# Patient Record
Sex: FEMALE | Race: ASIAN | Employment: FULL TIME | URBAN - METROPOLITAN AREA
[De-identification: names, ages, dates, MRNs, and addresses within clinical notes are randomized per-mention and may not be internally consistent; named-entity substitution may affect disease eponyms.]

---

## 2017-02-28 ENCOUNTER — TELEPHONE (OUTPATIENT)
Dept: FAMILY MEDICINE CLINIC | Facility: CLINIC | Age: 37
End: 2017-02-28

## 2017-02-28 ENCOUNTER — OFFICE VISIT (OUTPATIENT)
Dept: FAMILY MEDICINE CLINIC | Facility: CLINIC | Age: 37
End: 2017-02-28

## 2017-02-28 VITALS
RESPIRATION RATE: 16 BRPM | DIASTOLIC BLOOD PRESSURE: 58 MMHG | TEMPERATURE: 99 F | SYSTOLIC BLOOD PRESSURE: 102 MMHG | WEIGHT: 123 LBS | HEART RATE: 84 BPM | OXYGEN SATURATION: 98 % | BODY MASS INDEX: 25 KG/M2

## 2017-02-28 DIAGNOSIS — R50.9 FEVER, UNSPECIFIED FEVER CAUSE: Primary | ICD-10-CM

## 2017-02-28 DIAGNOSIS — J11.1 INFLUENZA-LIKE ILLNESS: ICD-10-CM

## 2017-02-28 LAB
CONTROL LINE PRESENT WITH A CLEAR BACKGROUND (YES/NO): YES YES/NO
STREP GRP A CUL-SCR: NEGATIVE

## 2017-02-28 PROCEDURE — 99213 OFFICE O/P EST LOW 20 MIN: CPT | Performed by: NURSE PRACTITIONER

## 2017-02-28 PROCEDURE — 87880 STREP A ASSAY W/OPTIC: CPT | Performed by: NURSE PRACTITIONER

## 2017-02-28 NOTE — PROGRESS NOTES
CHIEF COMPLAINT:   Patient presents with:  URI      HPI:   Mary Jane Springer is a 42year old female who presents for upper respiratory symptoms for  3 days.  Patient reports abrupt onset dry cough, body aches, fevers (tmax at onset 100-101F), scratchy throat NOSE: Nostrils patent, no visible nasal discharge, nasal mucosa pink and non inflamed. THROAT: Oral mucosa pink, moist. Posterior pharynx is mildly erythematous with small PND. No exudates.    NECK: Supple, non-tender  LUNGS: clear to auscultation bilatera Symptoms of the flu may be mild or severe. They can include extreme tiredness (wanting to stay in bed all day), chills, fevers, muscle aches, soreness with eye movement, headache, and a dry, hacking cough.   Home care  Follow these guidelines when caring fo · Severe weakness or dizziness  · You get a fever or cough after getting better for a few days  Date Last Reviewed: 12/23/2014  © 7352-4926 The [address] [place] [address], [address] [place] [address]. All rights reserved.  This information is not i

## 2017-02-28 NOTE — TELEPHONE ENCOUNTER
Patient called and left a message that she's had fever and chills since Saturday. She would like a call back suggesting what she can do.

## 2017-02-28 NOTE — TELEPHONE ENCOUNTER
Spoke to patient has fever 100 taking OTC tyl and motrin. Had body aches now has cough and congestion. Offered appointment says she is working. Advised to go to [facility] when she can f/u with PCP prn.

## 2017-06-20 RX ORDER — LEVOTHYROXINE SODIUM 112 UG/1
TABLET ORAL
Qty: 90 TABLET | Refills: 0 | Status: SHIPPED | OUTPATIENT
Start: 2017-06-20 | End: 2017-12-27

## 2017-06-20 NOTE — TELEPHONE ENCOUNTER
No future appointments. LOV 12/16    LAST LAB 11/16    LAST RX   Levothyroxine Sodium 112 MCG Oral Tab 90 tablet 1 12/23/2016       PROTOCOL  Thyroid Supplements Protocol Passed6/2    Refilled x 3.

## 2017-10-03 DIAGNOSIS — E03.9 ACQUIRED HYPOTHYROIDISM: ICD-10-CM

## 2017-10-03 RX ORDER — LEVOTHYROXINE SODIUM 112 UG/1
TABLET ORAL
Qty: 30 TABLET | Refills: 1 | Status: SHIPPED | OUTPATIENT
Start: 2017-10-03 | End: 2017-12-16

## 2017-10-03 NOTE — TELEPHONE ENCOUNTER
No future appointments. LOV 12/16     LAST LAB 11/16    LAST RX  Last refill: 9/10/2017    LEVOTHYROXINE SODIUM 112 MCG Oral Tab 90 tablet 0 6/20/2017       PROTOCOL  Thyroid Supplements Protocol Passed. Refilled x 2.

## 2017-12-16 DIAGNOSIS — E03.9 ACQUIRED HYPOTHYROIDISM: ICD-10-CM

## 2017-12-18 RX ORDER — LEVOTHYROXINE SODIUM 112 UG/1
TABLET ORAL
Qty: 15 TABLET | Refills: 0 | Status: SHIPPED | OUTPATIENT
Start: 2017-12-18 | End: 2017-12-27

## 2017-12-18 NOTE — TELEPHONE ENCOUNTER
Pt called back to schedule Appt for Medication Refill. Explained we will approve 2 weeks of medication and she will need to get labs done PRIOR to appt. Labs thru [facility]. Told pt labs will be in her chart tomorrow.  (Do not go to Lab this afternoon.)

## 2017-12-18 NOTE — TELEPHONE ENCOUNTER
No future appointments. LOV 12/16     LAST LAB 11/16     LAST RX   LEVOTHYROXINE SODIUM 112 MCG Oral Tab 30 tablet 1 10/3/2017     Sig: TAKE 1 TABLET(112 MCG) BY MOUTH BEFORE BREAKFAST    Notes to Pharmacy: Needs appt and lab for next refill.           P

## 2017-12-27 ENCOUNTER — OFFICE VISIT (OUTPATIENT)
Dept: FAMILY MEDICINE CLINIC | Facility: CLINIC | Age: 37
End: 2017-12-27

## 2017-12-27 VITALS
DIASTOLIC BLOOD PRESSURE: 54 MMHG | RESPIRATION RATE: 14 BRPM | HEART RATE: 74 BPM | HEIGHT: 59 IN | WEIGHT: 129.25 LBS | BODY MASS INDEX: 26.06 KG/M2 | TEMPERATURE: 99 F | SYSTOLIC BLOOD PRESSURE: 96 MMHG

## 2017-12-27 DIAGNOSIS — E03.9 ACQUIRED HYPOTHYROIDISM: Primary | ICD-10-CM

## 2017-12-27 PROCEDURE — 99213 OFFICE O/P EST LOW 20 MIN: CPT | Performed by: FAMILY MEDICINE

## 2017-12-27 RX ORDER — LEVOTHYROXINE SODIUM 112 UG/1
112 TABLET ORAL
Qty: 90 TABLET | Refills: 0 | Status: SHIPPED | OUTPATIENT
Start: 2017-12-27 | End: 2018-02-22

## 2017-12-27 NOTE — PROGRESS NOTES
HPI:   Lenny Willard is a 42year old female that presents for follow up hypothyroidism. TSH level was 6.6 on recent labs. Patient states she had been out of her levothyroxine for several days.   She is asymptomatic and denies any hair skin or nail change Oral Tab; Take 1 tablet (112 mcg total) by mouth before breakfast.  Dispense: 90 tablet; Refill: 0  - TSH+FREE T4; Future    Risks, benefits, and alternatives of current treatment plan discussed in detail. Red flags discussed to RTC or ED .  Questions and c

## 2018-02-22 ENCOUNTER — OFFICE VISIT (OUTPATIENT)
Dept: FAMILY MEDICINE CLINIC | Facility: CLINIC | Age: 38
End: 2018-02-22

## 2018-02-22 VITALS
HEART RATE: 59 BPM | OXYGEN SATURATION: 99 % | WEIGHT: 129.63 LBS | SYSTOLIC BLOOD PRESSURE: 110 MMHG | DIASTOLIC BLOOD PRESSURE: 62 MMHG | TEMPERATURE: 99 F | BODY MASS INDEX: 26.13 KG/M2 | RESPIRATION RATE: 15 BRPM | HEIGHT: 59.25 IN

## 2018-02-22 DIAGNOSIS — Z13.0 SCREENING FOR DEFICIENCY ANEMIA: ICD-10-CM

## 2018-02-22 DIAGNOSIS — Z13.29 SCREENING FOR THYROID DISORDER: ICD-10-CM

## 2018-02-22 DIAGNOSIS — Z01.419 WELL WOMAN EXAM WITH ROUTINE GYNECOLOGICAL EXAM: Primary | ICD-10-CM

## 2018-02-22 DIAGNOSIS — Z13.220 SCREENING, LIPID: ICD-10-CM

## 2018-02-22 DIAGNOSIS — Z13.1 SCREENING FOR DIABETES MELLITUS: ICD-10-CM

## 2018-02-22 DIAGNOSIS — E03.9 ACQUIRED HYPOTHYROIDISM: ICD-10-CM

## 2018-02-22 DIAGNOSIS — Z12.31 ENCOUNTER FOR SCREENING MAMMOGRAM FOR BREAST CANCER: ICD-10-CM

## 2018-02-22 PROCEDURE — 88175 CYTOPATH C/V AUTO FLUID REDO: CPT | Performed by: FAMILY MEDICINE

## 2018-02-22 PROCEDURE — 99395 PREV VISIT EST AGE 18-39: CPT | Performed by: FAMILY MEDICINE

## 2018-02-22 PROCEDURE — 87624 HPV HI-RISK TYP POOLED RSLT: CPT | Performed by: FAMILY MEDICINE

## 2018-02-22 RX ORDER — LEVOTHYROXINE SODIUM 112 UG/1
112 TABLET ORAL
Qty: 30 TABLET | Refills: 0 | Status: SHIPPED | OUTPATIENT
Start: 2018-02-22 | End: 2018-03-21

## 2018-02-22 NOTE — PROGRESS NOTES
Leonidas Mckenzie is a 42year old female here for Patient presents with: Well Adult: Physical with pap       HPI:   Patient complains of here for well exam.     Saw Dr. Parth Harvey but had run out of meds so she had TSH of 6.65, will reorder that now.      No heav No unusual bleeding or bruising, no lymph node enlargement or tenderness. Endocrine: No thyroid symptoms. No symptoms of diabetes. Respiratory: No cough, shortness of breath, dyspnea on exertion, wheezing.   Cardiovascular: No heart palpitations, irregula labia majora and minora, normal urethra. Vagina with normal introitus, no discharge. Cervix is normal,PAP was done. IUD string visible. Bimanual: No cervical motion tenderness. Uterus retroverted. Adnexae nontender, no masses.   MUSCULOSKELETAL: Back and ex

## 2018-02-22 NOTE — PATIENT INSTRUCTIONS
Health screening: Pap recommended ever 3 years. HPV testing over age 27, HIV screening available. Tdap or Td recommended if tetanus not received for 10 years.   Contraception discussed, consider gyne consult to discuss options for permanent sterilization at

## 2018-02-24 LAB — HPV I/H RISK 1 DNA SPEC QL NAA+PROBE: NEGATIVE

## 2018-02-27 LAB — LAST PAP RESULT: NORMAL

## 2018-03-21 ENCOUNTER — TELEPHONE (OUTPATIENT)
Dept: FAMILY MEDICINE CLINIC | Facility: CLINIC | Age: 38
End: 2018-03-21

## 2018-03-21 DIAGNOSIS — Z13.29 SCREENING FOR THYROID DISORDER: ICD-10-CM

## 2018-03-21 DIAGNOSIS — E03.9 ACQUIRED HYPOTHYROIDISM: ICD-10-CM

## 2018-03-21 LAB
ABSOLUTE BASOPHILS: 32 CELLS/UL (ref 0–200)
ABSOLUTE EOSINOPHILS: 131 CELLS/UL (ref 15–500)
ABSOLUTE LYMPHOCYTES: 2183 CELLS/UL (ref 850–3900)
ABSOLUTE MONOCYTES: 243 CELLS/UL (ref 200–950)
ABSOLUTE NEUTROPHILS: 1913 CELLS/UL (ref 1500–7800)
ALBUMIN/GLOBULIN RATIO: 1.7 (CALC) (ref 1–2.5)
ALBUMIN: 4.2 G/DL (ref 3.6–5.1)
ALKALINE PHOSPHATASE: 38 U/L (ref 33–115)
ALT: 13 U/L (ref 6–29)
AST: 17 U/L (ref 10–30)
BASOPHILS: 0.7 %
BILIRUBIN, TOTAL: 0.7 MG/DL (ref 0.2–1.2)
BUN: 9 MG/DL (ref 7–25)
CALCIUM: 9.2 MG/DL (ref 8.6–10.2)
CARBON DIOXIDE: 26 MMOL/L (ref 20–31)
CHLORIDE: 105 MMOL/L (ref 98–110)
CHOL/HDLC RATIO: 2.5 (CALC)
CHOLESTEROL, TOTAL: 159 MG/DL
CREATININE: 0.74 MG/DL (ref 0.5–1.1)
EGFR IF AFRICN AM: 117 ML/MIN/1.73M2
EGFR IF NONAFRICN AM: 101 ML/MIN/1.73M2
EOSINOPHILS: 2.9 %
GLOBULIN: 2.5 G/DL (CALC) (ref 1.9–3.7)
GLUCOSE: 91 MG/DL (ref 65–99)
HDL CHOLESTEROL: 63 MG/DL
HEMATOCRIT: 35.7 % (ref 35–45)
HEMOGLOBIN A1C: 4.9 % OF TOTAL HGB
HEMOGLOBIN: 11.7 G/DL (ref 11.7–15.5)
LDL-CHOLESTEROL: 82 MG/DL (CALC)
LYMPHOCYTES: 48.5 %
MCH: 29.8 PG (ref 27–33)
MCHC: 32.8 G/DL (ref 32–36)
MCV: 90.8 FL (ref 80–100)
MONOCYTES: 5.4 %
MPV: 10.6 FL (ref 7.5–12.5)
NEUTROPHILS: 42.5 %
NON-HDL CHOLESTEROL: 96 MG/DL (CALC)
PLATELET COUNT: 226 THOUSAND/UL (ref 140–400)
POTASSIUM: 4.5 MMOL/L (ref 3.5–5.3)
PROTEIN, TOTAL: 6.7 G/DL (ref 6.1–8.1)
RDW: 11.3 % (ref 11–15)
RED BLOOD CELL COUNT: 3.93 MILLION/UL (ref 3.8–5.1)
SODIUM: 137 MMOL/L (ref 135–146)
TRIGLYCERIDES: 65 MG/DL
TSH W/REFLEX TO FT4: 1.13 MIU/L
WHITE BLOOD CELL COUNT: 4.5 THOUSAND/UL (ref 3.8–10.8)

## 2018-03-22 RX ORDER — LEVOTHYROXINE SODIUM 112 UG/1
TABLET ORAL
Qty: 30 TABLET | Refills: 6 | Status: SHIPPED | OUTPATIENT
Start: 2018-03-22 | End: 2018-11-12

## 2018-03-22 NOTE — TELEPHONE ENCOUNTER
LOV 2/18    LAST LAB 3/18    LAST RX   Levothyroxine Sodium 112 MCG Oral Tab 30 tablet 0 2/22/2018       PROTOCOL  Thyroid Supplements Protocol Passed    Refilled x 6 months.

## 2018-11-12 ENCOUNTER — TELEPHONE (OUTPATIENT)
Dept: FAMILY MEDICINE CLINIC | Facility: CLINIC | Age: 38
End: 2018-11-12

## 2018-11-12 DIAGNOSIS — E03.9 ACQUIRED HYPOTHYROIDISM: ICD-10-CM

## 2018-11-12 DIAGNOSIS — Z13.29 SCREENING FOR THYROID DISORDER: ICD-10-CM

## 2018-11-12 RX ORDER — LEVOTHYROXINE SODIUM 112 UG/1
TABLET ORAL
Qty: 30 TABLET | Refills: 6 | Status: SHIPPED | OUTPATIENT
Start: 2018-11-12 | End: 2019-07-07

## 2018-11-12 NOTE — TELEPHONE ENCOUNTER
Pt is leaving for [facility] on 12/10/2018 states she was only given a refill for Levothyroxine for 30 days pt states she was told it would be for six months.  Please advise patient would like a call back today if possible in case she needs to do labs or come in

## 2018-11-13 NOTE — TELEPHONE ENCOUNTER
Patient notified of prescription renewal.  Deb Anderson would like to see her in March. Patient [place] country [place] and will call for appointment when she returns.

## 2019-04-19 ENCOUNTER — OFFICE VISIT (OUTPATIENT)
Dept: FAMILY MEDICINE CLINIC | Facility: CLINIC | Age: 39
End: 2019-04-19
Payer: COMMERCIAL

## 2019-04-19 VITALS
BODY MASS INDEX: 26 KG/M2 | WEIGHT: 130.69 LBS | RESPIRATION RATE: 15 BRPM | HEIGHT: 59.5 IN | OXYGEN SATURATION: 96 % | DIASTOLIC BLOOD PRESSURE: 64 MMHG | SYSTOLIC BLOOD PRESSURE: 102 MMHG | TEMPERATURE: 98 F | HEART RATE: 63 BPM

## 2019-04-19 DIAGNOSIS — Z13.1 SCREENING FOR DIABETES MELLITUS: ICD-10-CM

## 2019-04-19 DIAGNOSIS — E03.9 ACQUIRED HYPOTHYROIDISM: ICD-10-CM

## 2019-04-19 DIAGNOSIS — Z13.29 SCREENING FOR THYROID DISORDER: ICD-10-CM

## 2019-04-19 DIAGNOSIS — N63.22 BREAST LUMP ON LEFT SIDE AT 11 O'CLOCK POSITION: ICD-10-CM

## 2019-04-19 DIAGNOSIS — Z00.00 WELL ADULT EXAM: Primary | ICD-10-CM

## 2019-04-19 DIAGNOSIS — Z13.0 SCREENING FOR DEFICIENCY ANEMIA: ICD-10-CM

## 2019-04-19 DIAGNOSIS — Z78.9 VEGETARIAN DIET: ICD-10-CM

## 2019-04-19 DIAGNOSIS — Z30.431 CONTRACEPTIVE, SURVEILLANCE, INTRAUTERINE DEVICE: ICD-10-CM

## 2019-04-19 DIAGNOSIS — Z13.220 SCREENING, LIPID: ICD-10-CM

## 2019-04-19 PROCEDURE — 99396 PREV VISIT EST AGE 40-64: CPT | Performed by: FAMILY MEDICINE

## 2019-04-19 PROCEDURE — 99213 OFFICE O/P EST LOW 20 MIN: CPT | Performed by: FAMILY MEDICINE
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
## 2019-04-19 NOTE — PATIENT INSTRUCTIONS
Tdap (tetanus diphtheria pertussis) booster recommended if not received in previous 10 years.  Mammogram diagnostic ordered for lumpy changes left upper outer breast. If you still have your cervix (if your uterus was not removed), Pap recommended every 3 ye

## 2019-05-03 ENCOUNTER — TELEPHONE (OUTPATIENT)
Dept: FAMILY MEDICINE CLINIC | Facility: CLINIC | Age: 39
End: 2019-05-03

## 2019-05-03 ENCOUNTER — HOSPITAL ENCOUNTER (OUTPATIENT)
Dept: MAMMOGRAPHY | Facility: HOSPITAL | Age: 39
Discharge: HOME | End: 2019-05-03
Attending: FAMILY MEDICINE
Payer: COMMERCIAL

## 2019-05-03 DIAGNOSIS — N63.22 BREAST LUMP ON LEFT SIDE AT 11 O'CLOCK POSITION: ICD-10-CM

## 2019-05-03 PROCEDURE — 77066 DX MAMMO INCL CAD BI: CPT | Performed by: FAMILY MEDICINE

## 2019-05-03 PROCEDURE — 76641 ULTRASOUND BREAST COMPLETE: CPT | Performed by: FAMILY MEDICINE

## 2019-05-03 PROCEDURE — 77062 BREAST TOMOSYNTHESIS BI: CPT | Performed by: FAMILY MEDICINE

## 2019-05-03 NOTE — TELEPHONE ENCOUNTER
Called patient and explained per Dr. Shereen Felder, order cannot be changed because she has a breast lump. The mammogram is no longer screening. It needs to be diagnostic. Verbalizes understanding.

## 2019-05-03 NOTE — TELEPHONE ENCOUNTER
Dr. Nolberto Martinez,  Please advise    Patient called requesting mammogram order be change to screening. She checked with the hospital and with her insurance and a diagnostic mammogram will not be covered at 100%. Patient has appt for 1 pm today.

## 2019-05-03 NOTE — TELEPHONE ENCOUNTER
Patient called with question regarding mammogram and ultrasound, scheduled for today, transferred to triage, please call back

## 2019-05-03 NOTE — TELEPHONE ENCOUNTER
I can't change order because there is a lump. If we do a test because of a problem it is no longer screening, but investigation of a problem. Sorry.  I know coverage is different but radiology won't let us order screening for a problem that needs to be diag

## 2019-07-07 DIAGNOSIS — E03.9 ACQUIRED HYPOTHYROIDISM: ICD-10-CM

## 2019-07-07 DIAGNOSIS — Z13.29 SCREENING FOR THYROID DISORDER: ICD-10-CM

## 2019-07-08 RX ORDER — LEVOTHYROXINE SODIUM 112 UG/1
TABLET ORAL
Qty: 30 TABLET | Refills: 5 | Status: SHIPPED | OUTPATIENT
Start: 2019-07-08 | End: 2020-02-11

## 2019-07-08 NOTE — TELEPHONE ENCOUNTER
Name from pharmacy: LEVOTHYROXINE 0.112MG (112MCG) TABS         Will file in chart as: LEVOTHYROXINE SODIUM 112 MCG Oral Tab    Sig: TAKE 1 TABLET(112 MCG) BY MOUTH BEFORE BREAKFAST    Disp:  30 tablet    Refills:  0    Start: 7/7/2019    Class: Normal

## 2019-08-28 ENCOUNTER — TELEPHONE (OUTPATIENT)
Dept: FAMILY MEDICINE CLINIC | Facility: CLINIC | Age: 39
End: 2019-08-28

## 2019-08-28 DIAGNOSIS — E03.9 ACQUIRED HYPOTHYROIDISM: ICD-10-CM

## 2019-08-28 DIAGNOSIS — Z13.29 SCREENING FOR THYROID DISORDER: ICD-10-CM

## 2019-08-28 RX ORDER — LEVOTHYROXINE SODIUM 112 UG/1
TABLET ORAL
Qty: 30 TABLET | Refills: 0 | OUTPATIENT
Start: 2019-08-28

## 2019-08-28 NOTE — TELEPHONE ENCOUNTER
Requested Prescriptions     Pending Prescriptions Disp Refills   • Levothyroxine Sodium 112 MCG Oral Tab [Pharmacy Med Name: LEVOTHYROXINE 0.112MG (112MCG) TABS] 30 tablet 0     Sig: TAKE 1 TABLET BY MOUTH EVERY DAY         LOV: 4/19/19 w/ 1yr f/u recommen

## 2019-12-13 ENCOUNTER — OFFICE VISIT (OUTPATIENT)
Dept: FAMILY MEDICINE CLINIC | Facility: CLINIC | Age: 39
End: 2019-12-13
Payer: COMMERCIAL

## 2019-12-13 VITALS
DIASTOLIC BLOOD PRESSURE: 60 MMHG | HEIGHT: 60 IN | SYSTOLIC BLOOD PRESSURE: 90 MMHG | WEIGHT: 134 LBS | RESPIRATION RATE: 16 BRPM | BODY MASS INDEX: 26.31 KG/M2 | TEMPERATURE: 98 F | OXYGEN SATURATION: 100 % | HEART RATE: 73 BPM

## 2019-12-13 DIAGNOSIS — J04.0 LARYNGITIS: ICD-10-CM

## 2019-12-13 DIAGNOSIS — Z20.818 EXPOSURE TO STREP THROAT: ICD-10-CM

## 2019-12-13 DIAGNOSIS — J06.9 UPPER RESPIRATORY TRACT INFECTION, UNSPECIFIED TYPE: Primary | ICD-10-CM

## 2019-12-13 PROCEDURE — 99213 OFFICE O/P EST LOW 20 MIN: CPT | Performed by: FAMILY MEDICINE

## 2019-12-13 PROCEDURE — 87880 STREP A ASSAY W/OPTIC: CPT | Performed by: FAMILY MEDICINE

## 2019-12-13 RX ORDER — BENZONATATE 200 MG/1
200 CAPSULE ORAL 3 TIMES DAILY PRN
Qty: 20 CAPSULE | Refills: 1 | Status: SHIPPED | OUTPATIENT
Start: 2019-12-13 | End: 2020-07-17 | Stop reason: ALTCHOICE

## 2019-12-13 NOTE — PATIENT INSTRUCTIONS
Budesonide (Rhinocort), triamcinolone (Nasacort) or mometasone (Nasonex) nasal spray with a steroid, 1 spray each nostril daily for sinus congestion. Voice rest. Consider steroids if voice worse. Increased fluids, humidity.  Notify us if worsening laryng

## 2019-12-13 NOTE — PROGRESS NOTES
Romel Miguel IS A 37year old female HERE FOR Patient presents with:  Cold: cough. runny nose, congestion. pt was saying daughter was diagnose for positive strep. History of present illness:     2 nights ago bad cough, couldn't sleep.  Trying mucine control/protection: Paragard    Lifestyle      Physical activity:        Days per week: Not on file        Minutes per session: Not on file      Stress: Not on file    Relationships      Social connections:        Talks on phone: Not on file        Gets to strep throat  -     STREP A ASSAY W/OPTIC    Other orders  -     benzonatate 200 MG Oral Cap; Take 1 capsule (200 mg total) by mouth 3 (three) times daily as needed for cough.           Patient Instructions   Budesonide (Rhinocort), triamcinolone (Nasacort)

## 2020-02-09 ENCOUNTER — TELEPHONE (OUTPATIENT)
Dept: FAMILY MEDICINE CLINIC | Facility: CLINIC | Age: 40
End: 2020-02-09

## 2020-02-09 DIAGNOSIS — Z13.1 SCREENING FOR DIABETES MELLITUS: ICD-10-CM

## 2020-02-09 DIAGNOSIS — E03.9 ACQUIRED HYPOTHYROIDISM: ICD-10-CM

## 2020-02-09 DIAGNOSIS — Z13.0 SCREENING, ANEMIA, DEFICIENCY, IRON: Primary | ICD-10-CM

## 2020-02-09 DIAGNOSIS — Z13.29 SCREENING FOR THYROID DISORDER: ICD-10-CM

## 2020-02-09 DIAGNOSIS — Z13.220 SCREENING, LIPID: ICD-10-CM

## 2020-02-10 NOTE — TELEPHONE ENCOUNTER
Pt called asking status of the refill request.  Did not want to schedule brian. Wants to know why script denied. Pt is out of med.

## 2020-02-11 RX ORDER — LEVOTHYROXINE SODIUM 112 UG/1
TABLET ORAL
Qty: 30 TABLET | Refills: 5 | Status: SHIPPED | OUTPATIENT
Start: 2020-02-11 | End: 2020-08-17

## 2020-02-11 NOTE — TELEPHONE ENCOUNTER
I refilled, she should take med continuously for 2 months then get TSH done.  I entered screening lab codes also, will need ofr physical.

## 2020-02-11 NOTE — TELEPHONE ENCOUNTER
Last TSH 4/2019    Dr. Patti Manzanares - Should patient have labs done now and follow up or how would you like to proceed.        Pt is out of medication

## 2020-02-12 NOTE — TELEPHONE ENCOUNTER
Called patient and explained plan of care to take levothyroxine daily for two months and then have lab work done. Advised all routine fasting labs have been ordered for annual PE. Instructed to make appt for annual PE for mid April after blood draw.   She

## 2020-07-17 ENCOUNTER — OFFICE VISIT (OUTPATIENT)
Dept: FAMILY MEDICINE CLINIC | Facility: CLINIC | Age: 40
End: 2020-07-17
Payer: COMMERCIAL

## 2020-07-17 VITALS
OXYGEN SATURATION: 99 % | TEMPERATURE: 98 F | WEIGHT: 134 LBS | DIASTOLIC BLOOD PRESSURE: 40 MMHG | BODY MASS INDEX: 26.66 KG/M2 | HEIGHT: 59.5 IN | RESPIRATION RATE: 16 BRPM | SYSTOLIC BLOOD PRESSURE: 84 MMHG | HEART RATE: 64 BPM

## 2020-07-17 DIAGNOSIS — Z13.21 SCREENING FOR ENDOCRINE, NUTRITIONAL, METABOLIC AND IMMUNITY DISORDER: ICD-10-CM

## 2020-07-17 DIAGNOSIS — E03.9 ACQUIRED HYPOTHYROIDISM: Primary | ICD-10-CM

## 2020-07-17 DIAGNOSIS — Z13.29 SCREENING FOR THYROID DISORDER: ICD-10-CM

## 2020-07-17 DIAGNOSIS — Z13.29 SCREENING FOR ENDOCRINE, NUTRITIONAL, METABOLIC AND IMMUNITY DISORDER: ICD-10-CM

## 2020-07-17 DIAGNOSIS — Z12.31 ENCOUNTER FOR SCREENING MAMMOGRAM FOR BREAST CANCER: ICD-10-CM

## 2020-07-17 DIAGNOSIS — Z00.00 WELL ADULT EXAM: ICD-10-CM

## 2020-07-17 DIAGNOSIS — Z13.0 SCREENING FOR DEFICIENCY ANEMIA: ICD-10-CM

## 2020-07-17 DIAGNOSIS — Z13.1 SCREENING FOR DIABETES MELLITUS: ICD-10-CM

## 2020-07-17 DIAGNOSIS — Z13.0 SCREENING FOR ENDOCRINE, NUTRITIONAL, METABOLIC AND IMMUNITY DISORDER: ICD-10-CM

## 2020-07-17 DIAGNOSIS — Z13.220 SCREENING, LIPID: ICD-10-CM

## 2020-07-17 DIAGNOSIS — Z13.228 SCREENING FOR ENDOCRINE, NUTRITIONAL, METABOLIC AND IMMUNITY DISORDER: ICD-10-CM

## 2020-07-17 PROCEDURE — 3008F BODY MASS INDEX DOCD: CPT | Performed by: FAMILY MEDICINE

## 2020-07-17 PROCEDURE — 3074F SYST BP LT 130 MM HG: CPT | Performed by: FAMILY MEDICINE

## 2020-07-17 PROCEDURE — 3078F DIAST BP <80 MM HG: CPT | Performed by: FAMILY MEDICINE

## 2020-07-17 PROCEDURE — 99396 PREV VISIT EST AGE 40-64: CPT | Performed by: FAMILY MEDICINE

## 2020-07-17 NOTE — PROGRESS NOTES
Danis Rogers is a 41year old female here for Patient presents with: Well Adult      HPI:   Patient complains of here for well exam.     Feels well. Needs TSH for thyroid, has some refills. Had IUD changed. Saw Dr. Mary Sierra. Last fall.      Esperanza Rodríguez file        Gets together: Not on file        Attends [employer] service: Not on file        Active member of club or organization: Not on file        Attends meetings of clubs or organizations: Not on file        Relationship status: Not on file      Intim thyromegaly. CHEST: no chest wall tenderness. BREASTS: Texture normal, no dominant or suspicious mass, no nipple inversion or discharge, no axillary adenopathy. LUNGS: clear to auscultation. CARDIO: Regular rate and rhythm without murmur S3 or S4.   GI

## 2020-07-17 NOTE — PATIENT INSTRUCTIONS
Call for appt  lab tests, recommend scheduling with [facility].     Health screening: Mammogram recommended every 1-2 years after age 36. If you still have your cervix (if your uterus was not removed), Pap recommended every 3 years until age 72. Lifestyle:  Act provider to make sure you’re up-to-date on what you need.   Screening Who needs it How often   Type 2 diabetes or prediabetes All women beginning at age 39 and women without symptoms at any age who are overweight or obese and have 1 or more additional risk Talk with your healthcare provider about your health history.    Chlamydia Women at increased risk for infection At routine exams if you're at risk or have symptoms   Depression All women in this age group At routine exams   Gonorrhea Sexually active women women in this age group who have no record of these infections or vaccines 1 or 2 doses   Meningococcal Women at increased risk for infection–talk with your healthcare provider 1 or more doses   Pneumococcal conjugate vaccine (PCV13) and pneumococcal polys

## 2020-07-29 DIAGNOSIS — D50.9 IRON DEFICIENCY ANEMIA, UNSPECIFIED IRON DEFICIENCY ANEMIA TYPE: Primary | ICD-10-CM

## 2020-07-29 LAB
ABSOLUTE BASOPHILS: 49 CELLS/UL (ref 0–200)
ABSOLUTE EOSINOPHILS: 173 CELLS/UL (ref 15–500)
ABSOLUTE LYMPHOCYTES: 2630 CELLS/UL (ref 850–3900)
ABSOLUTE MONOCYTES: 394 CELLS/UL (ref 200–950)
ABSOLUTE NEUTROPHILS: 2155 CELLS/UL (ref 1500–7800)
ALBUMIN/GLOBULIN RATIO: 1.5 (CALC) (ref 1–2.5)
ALBUMIN: 3.9 G/DL (ref 3.6–5.1)
ALKALINE PHOSPHATASE: 48 U/L (ref 31–125)
ALT: 9 U/L (ref 6–29)
AST: 15 U/L (ref 10–30)
BASOPHILS: 0.9 %
BILIRUBIN, TOTAL: 0.5 MG/DL (ref 0.2–1.2)
BUN: 13 MG/DL (ref 7–25)
CALCIUM: 9.1 MG/DL (ref 8.6–10.2)
CARBON DIOXIDE: 27 MMOL/L (ref 20–32)
CHLORIDE: 109 MMOL/L (ref 98–110)
CHOL/HDLC RATIO: 2.9 (CALC)
CHOLESTEROL, TOTAL: 156 MG/DL
CREATININE: 0.81 MG/DL (ref 0.5–1.1)
EGFR IF AFRICN AM: 104 ML/MIN/1.73M2
EGFR IF NONAFRICN AM: 90 ML/MIN/1.73M2
EOSINOPHILS: 3.2 %
GLOBULIN: 2.6 G/DL (CALC) (ref 1.9–3.7)
GLUCOSE: 91 MG/DL (ref 65–99)
HDL CHOLESTEROL: 53 MG/DL
HEMATOCRIT: 34.9 % (ref 35–45)
HEMOGLOBIN A1C: 5.1 % OF TOTAL HGB
HEMOGLOBIN: 11.2 G/DL (ref 11.7–15.5)
LDL-CHOLESTEROL: 88 MG/DL (CALC)
LYMPHOCYTES: 48.7 %
MCH: 28.7 PG (ref 27–33)
MCHC: 32.1 G/DL (ref 32–36)
MCV: 89.5 FL (ref 80–100)
MONOCYTES: 7.3 %
MPV: 10.7 FL (ref 7.5–12.5)
NEUTROPHILS: 39.9 %
NON-HDL CHOLESTEROL: 103 MG/DL (CALC)
PLATELET COUNT: 243 THOUSAND/UL (ref 140–400)
POTASSIUM: 4.4 MMOL/L (ref 3.5–5.3)
PROTEIN, TOTAL: 6.5 G/DL (ref 6.1–8.1)
RDW: 11.6 % (ref 11–15)
RED BLOOD CELL COUNT: 3.9 MILLION/UL (ref 3.8–5.1)
SODIUM: 140 MMOL/L (ref 135–146)
TRIGLYCERIDES: 66 MG/DL
TSH W/REFLEX TO FT4: 1.44 MIU/L
WHITE BLOOD CELL COUNT: 5.4 THOUSAND/UL (ref 3.8–10.8)

## 2020-08-17 ENCOUNTER — TELEPHONE (OUTPATIENT)
Dept: FAMILY MEDICINE CLINIC | Facility: CLINIC | Age: 40
End: 2020-08-17

## 2020-08-17 DIAGNOSIS — Z13.29 SCREENING FOR THYROID DISORDER: ICD-10-CM

## 2020-08-17 DIAGNOSIS — E03.9 ACQUIRED HYPOTHYROIDISM: ICD-10-CM

## 2020-08-17 RX ORDER — LEVOTHYROXINE SODIUM 112 UG/1
TABLET ORAL
Qty: 30 TABLET | Refills: 5 | Status: SHIPPED | OUTPATIENT
Start: 2020-08-17 | End: 2021-02-16

## 2020-08-17 NOTE — TELEPHONE ENCOUNTER
Levothyroxine Sodium 112 MCG Oral Tab Sig:   TAKE 1 TABLET(112MCG) BY MOUTH BEFORE BREAKFAST    Patient is needing a refill on this medication.        [facility]  In [place] on [address]

## 2020-08-17 NOTE — TELEPHONE ENCOUNTER
LOV 7/17/2020    LAST LAB 7/28/20    LAST RX    Levothyroxine Sodium 112 MCG Oral Tab 30 tablet 5 2/11/2020    Sig:   TAKE 1 TABLET(112MCG) BY MOUTH BEFORE BREAKFAST            Next OV No future appointments.      PROTOCOL   Thyroid Supplements Protocol Pas

## 2021-02-16 DIAGNOSIS — Z13.29 SCREENING FOR THYROID DISORDER: ICD-10-CM

## 2021-02-16 DIAGNOSIS — E03.9 ACQUIRED HYPOTHYROIDISM: ICD-10-CM

## 2021-02-16 RX ORDER — LEVOTHYROXINE SODIUM 112 UG/1
TABLET ORAL
Qty: 30 TABLET | Refills: 2 | Status: SHIPPED | OUTPATIENT
Start: 2021-02-16 | End: 2021-05-20

## 2021-02-16 NOTE — TELEPHONE ENCOUNTER
LOV 7/17/2020    LAST LAB 7/28/2020     LAST RX   Levothyroxine Sodium 112 MCG Oral Tab 30 tablet 5 8/17/2020    Sig:   TAKE 1 TABLET(112 MCG) BY MOUTH BEFORE BREAKFAST           Next OV No future appointments.     PROTOCOL   Thyroid Supplements Protocol Pa

## 2021-03-05 ENCOUNTER — PATIENT MESSAGE (OUTPATIENT)
Dept: FAMILY MEDICINE CLINIC | Facility: CLINIC | Age: 41
End: 2021-03-05

## 2021-03-05 DIAGNOSIS — E03.9 ACQUIRED HYPOTHYROIDISM: Primary | ICD-10-CM

## 2021-03-05 DIAGNOSIS — R53.83 FATIGUE, UNSPECIFIED TYPE: ICD-10-CM

## 2021-03-05 NOTE — TELEPHONE ENCOUNTER
LOV: 7/17/20    Will pt need an appointment?   Thyroid labs pended for your approval if ok    Please advise, thank you

## 2021-03-05 NOTE — TELEPHONE ENCOUNTER
I can order a tsh, free T4 T3 but if normal will need appointment to discuss possible cause of symptoms. Notify her I did labs, if abnormal would like to do video visit to discuss.

## 2021-03-05 NOTE — TELEPHONE ENCOUNTER
PT calling about message below. Would like to know if she can get her thyroid levels checked. Will doctor place orders? Please advise.

## 2021-03-11 LAB
T3, FREE: 3 PG/ML (ref 2.3–4.2)
T4, FREE: 1.5 NG/DL (ref 0.8–1.8)
TSH: 1.25 MIU/L

## 2021-05-20 DIAGNOSIS — Z13.29 SCREENING FOR THYROID DISORDER: ICD-10-CM

## 2021-05-20 DIAGNOSIS — E03.9 ACQUIRED HYPOTHYROIDISM: ICD-10-CM

## 2021-05-20 RX ORDER — LEVOTHYROXINE SODIUM 112 UG/1
TABLET ORAL
Qty: 30 TABLET | Refills: 2 | Status: SHIPPED | OUTPATIENT
Start: 2021-05-20 | End: 2021-08-26

## 2021-05-20 NOTE — TELEPHONE ENCOUNTER
LOV 7/17/2020    LAST LAB 3/10/2021     LAST RX   LEVOTHYROXINE SODIUM 112 MCG Oral Tab 30 tablet 2 2/16/2021         Next OV No future appointments.       PROTOCOL passed

## 2021-08-23 ENCOUNTER — TELEPHONE (OUTPATIENT)
Dept: FAMILY MEDICINE CLINIC | Facility: CLINIC | Age: 41
End: 2021-08-23

## 2021-08-23 DIAGNOSIS — E03.9 ACQUIRED HYPOTHYROIDISM: ICD-10-CM

## 2021-08-23 DIAGNOSIS — Z13.29 SCREENING FOR THYROID DISORDER: ICD-10-CM

## 2021-08-23 RX ORDER — LEVOTHYROXINE SODIUM 112 UG/1
TABLET ORAL
Qty: 30 TABLET | Refills: 2 | OUTPATIENT
Start: 2021-08-23

## 2021-08-23 NOTE — TELEPHONE ENCOUNTER
[facility] for patient advising she needs appt for more refills as she hasn't been seen in over a year. Advised Dr. Gurpreet Oliver is retiring but will have another provided see her for refill until she chooses a new PCP.

## 2021-08-23 NOTE — TELEPHONE ENCOUNTER
Patient stated she received a notification that the refill request for Levothyroxine was denied. Patient said she has 4 days of medication. She asked why it was denied.

## 2021-08-26 RX ORDER — LEVOTHYROXINE SODIUM 112 UG/1
112 TABLET ORAL
Qty: 30 TABLET | Refills: 0 | Status: SHIPPED | OUTPATIENT
Start: 2021-08-26 | End: 2021-09-30

## 2021-08-26 NOTE — TELEPHONE ENCOUNTER
Future Appointments   Date Time Provider Billy Geronimo   9/3/2021  2:20 PM Elizabeth Record DO [facility]     Partial rx for levothyroxine sent to cover patient until seen at [address] next week.

## 2021-09-03 ENCOUNTER — OFFICE VISIT (OUTPATIENT)
Dept: FAMILY MEDICINE CLINIC | Facility: CLINIC | Age: 41
End: 2021-09-03
Payer: COMMERCIAL

## 2021-09-03 VITALS
WEIGHT: 144 LBS | BODY MASS INDEX: 28.65 KG/M2 | HEIGHT: 59.41 IN | HEART RATE: 54 BPM | OXYGEN SATURATION: 99 % | RESPIRATION RATE: 16 BRPM | DIASTOLIC BLOOD PRESSURE: 60 MMHG | SYSTOLIC BLOOD PRESSURE: 94 MMHG | TEMPERATURE: 98 F

## 2021-09-03 DIAGNOSIS — E55.9 VITAMIN D DEFICIENCY: ICD-10-CM

## 2021-09-03 DIAGNOSIS — E03.9 ACQUIRED HYPOTHYROIDISM: Primary | ICD-10-CM

## 2021-09-03 DIAGNOSIS — Z00.00 LABORATORY EXAM ORDERED AS PART OF ROUTINE GENERAL MEDICAL EXAMINATION: ICD-10-CM

## 2021-09-03 DIAGNOSIS — Z78.9 VEGETARIAN: ICD-10-CM

## 2021-09-03 DIAGNOSIS — D64.9 ANEMIA, UNSPECIFIED TYPE: ICD-10-CM

## 2021-09-03 PROCEDURE — 3078F DIAST BP <80 MM HG: CPT | Performed by: FAMILY MEDICINE

## 2021-09-03 PROCEDURE — 3074F SYST BP LT 130 MM HG: CPT | Performed by: FAMILY MEDICINE

## 2021-09-03 PROCEDURE — 3008F BODY MASS INDEX DOCD: CPT | Performed by: FAMILY MEDICINE

## 2021-09-03 PROCEDURE — 99213 OFFICE O/P EST LOW 20 MIN: CPT | Performed by: FAMILY MEDICINE

## 2021-09-03 NOTE — PROGRESS NOTES
HPI/Subjective:   Patient ID: Salomón Walter is a 35year old female. HPI   38yr old female presents for f/u on hypothyroidism. Hypothyroidism, taking levothyroxine 112mcg daily.  Notes wt gain over the past month or so and hair loss over the past coupl FREE T4    She understands and agrees with tx plan  RTC after completing labs for annual physical, sooner if needed    Orders Placed This Encounter      CBC With Diff      CMP      Lipid      TSH W Reflex To Free T4      Vitamin D, 25-Hydroxy      IRON AND

## 2021-09-25 LAB
% SATURATION: 33 % (CALC) (ref 16–45)
ABSOLUTE BASOPHILS: 20 CELLS/UL (ref 0–200)
ABSOLUTE EOSINOPHILS: 127 CELLS/UL (ref 15–500)
ABSOLUTE LYMPHOCYTES: 1980 CELLS/UL (ref 850–3900)
ABSOLUTE MONOCYTES: 348 CELLS/UL (ref 200–950)
ABSOLUTE NEUTROPHILS: 2426 CELLS/UL (ref 1500–7800)
ALBUMIN/GLOBULIN RATIO: 1.7 (CALC) (ref 1–2.5)
ALBUMIN: 4.3 G/DL (ref 3.6–5.1)
ALKALINE PHOSPHATASE: 45 U/L (ref 31–125)
ALT: 12 U/L (ref 6–29)
AST: 19 U/L (ref 10–30)
BASOPHILS: 0.4 %
BILIRUBIN, TOTAL: 0.5 MG/DL (ref 0.2–1.2)
BUN: 10 MG/DL (ref 7–25)
CALCIUM: 9.4 MG/DL (ref 8.6–10.2)
CARBON DIOXIDE: 28 MMOL/L (ref 20–32)
CHLORIDE: 107 MMOL/L (ref 98–110)
CHOL/HDLC RATIO: 2.6 (CALC)
CHOLESTEROL, TOTAL: 153 MG/DL
CREATININE: 0.7 MG/DL (ref 0.5–1.1)
EGFR IF AFRICN AM: 123 ML/MIN/1.73M2
EGFR IF NONAFRICN AM: 106 ML/MIN/1.73M2
EOSINOPHILS: 2.6 %
GLOBULIN: 2.5 G/DL (CALC) (ref 1.9–3.7)
GLUCOSE: 89 MG/DL (ref 65–99)
HDL CHOLESTEROL: 58 MG/DL
HEMATOCRIT: 34.4 % (ref 35–45)
HEMOGLOBIN: 11.2 G/DL (ref 11.7–15.5)
IRON BINDING CAPACITY: 284 MCG/DL (CALC) (ref 250–450)
IRON, TOTAL: 94 MCG/DL (ref 40–190)
LDL-CHOLESTEROL: 79 MG/DL (CALC)
LYMPHOCYTES: 40.4 %
MCH: 29.7 PG (ref 27–33)
MCHC: 32.6 G/DL (ref 32–36)
MCV: 91.2 FL (ref 80–100)
MONOCYTES: 7.1 %
MPV: 11 FL (ref 7.5–12.5)
NEUTROPHILS: 49.5 %
NON-HDL CHOLESTEROL: 95 MG/DL (CALC)
PLATELET COUNT: 249 THOUSAND/UL (ref 140–400)
POTASSIUM: 4.2 MMOL/L (ref 3.5–5.3)
PROTEIN, TOTAL: 6.8 G/DL (ref 6.1–8.1)
RDW: 11.6 % (ref 11–15)
RED BLOOD CELL COUNT: 3.77 MILLION/UL (ref 3.8–5.1)
SODIUM: 140 MMOL/L (ref 135–146)
TRIGLYCERIDES: 78 MG/DL
TSH W/REFLEX TO FT4: 0.51 MIU/L
VITAMIN B12: 334 PG/ML (ref 200–1100)
VITAMIN D, 25-OH, TOTAL: 21 NG/ML (ref 30–100)
WHITE BLOOD CELL COUNT: 4.9 THOUSAND/UL (ref 3.8–10.8)

## 2021-09-30 ENCOUNTER — TELEPHONE (OUTPATIENT)
Dept: FAMILY MEDICINE CLINIC | Facility: CLINIC | Age: 41
End: 2021-09-30

## 2021-09-30 DIAGNOSIS — E03.9 ACQUIRED HYPOTHYROIDISM: ICD-10-CM

## 2021-09-30 DIAGNOSIS — Z13.29 SCREENING FOR THYROID DISORDER: ICD-10-CM

## 2021-09-30 NOTE — TELEPHONE ENCOUNTER
Last refill  levothyroxine 112 MCG Oral Tab 30 tablet 0 8/26/2021    Sig:   Take 1 tablet (112 mcg total) by mouth before breakfast.         Per PCP OV notes 9/3/21: Acquired hypothyroidism  - cont levothyroxine 112mcg po daily  - will check TSH and adjust

## 2021-09-30 NOTE — TELEPHONE ENCOUNTER
Pt called stating she was seen on 9-3-21 for med f/u. Physical is scheduled for 10-18-21. Blood work was done. She does not understand why she cannot get the \" levothyroxine 112 MCG Oral Tab\" refilled. Pt said she is out.   Pharmacy told her they ar

## 2021-10-01 RX ORDER — LEVOTHYROXINE SODIUM 112 UG/1
112 TABLET ORAL
Qty: 90 TABLET | Refills: 1 | Status: SHIPPED | OUTPATIENT
Start: 2021-10-01 | End: 2021-10-18

## 2021-10-01 NOTE — TELEPHONE ENCOUNTER
Patient notified rx sent. Advised to be sure to keep appt with Dr. Jimena Brito on 10/18/21 to discuss lab results.

## 2021-10-18 ENCOUNTER — OFFICE VISIT (OUTPATIENT)
Dept: FAMILY MEDICINE CLINIC | Facility: CLINIC | Age: 41
End: 2021-10-18
Payer: COMMERCIAL

## 2021-10-18 VITALS
SYSTOLIC BLOOD PRESSURE: 114 MMHG | WEIGHT: 138.5 LBS | HEIGHT: 59.41 IN | HEART RATE: 71 BPM | DIASTOLIC BLOOD PRESSURE: 62 MMHG | RESPIRATION RATE: 16 BRPM | BODY MASS INDEX: 27.55 KG/M2 | TEMPERATURE: 97 F | OXYGEN SATURATION: 100 %

## 2021-10-18 DIAGNOSIS — E03.9 ACQUIRED HYPOTHYROIDISM: Primary | ICD-10-CM

## 2021-10-18 DIAGNOSIS — Z23 NEED FOR VACCINATION: ICD-10-CM

## 2021-10-18 DIAGNOSIS — E55.9 VITAMIN D DEFICIENCY: ICD-10-CM

## 2021-10-18 DIAGNOSIS — D64.9 ANEMIA, UNSPECIFIED TYPE: ICD-10-CM

## 2021-10-18 PROCEDURE — 3078F DIAST BP <80 MM HG: CPT | Performed by: FAMILY MEDICINE

## 2021-10-18 PROCEDURE — 99213 OFFICE O/P EST LOW 20 MIN: CPT | Performed by: FAMILY MEDICINE

## 2021-10-18 PROCEDURE — 90686 IIV4 VACC NO PRSV 0.5 ML IM: CPT | Performed by: FAMILY MEDICINE

## 2021-10-18 PROCEDURE — 90471 IMMUNIZATION ADMIN: CPT | Performed by: FAMILY MEDICINE

## 2021-10-18 PROCEDURE — 3008F BODY MASS INDEX DOCD: CPT | Performed by: FAMILY MEDICINE

## 2021-10-18 PROCEDURE — 3074F SYST BP LT 130 MM HG: CPT | Performed by: FAMILY MEDICINE

## 2021-10-18 RX ORDER — LEVOTHYROXINE SODIUM 112 UG/1
112 TABLET ORAL
Qty: 90 TABLET | Refills: 1 | Status: SHIPPED | OUTPATIENT
Start: 2021-10-18

## 2021-10-18 RX ORDER — ERGOCALCIFEROL 1.25 MG/1
50000 CAPSULE ORAL WEEKLY
Qty: 12 CAPSULE | Refills: 0 | Status: SHIPPED | OUTPATIENT
Start: 2021-10-18

## 2021-10-18 RX ORDER — MELATONIN
325
COMMUNITY

## 2021-10-18 NOTE — PROGRESS NOTES
Subjective:   Patient ID: Flako Marie is a 35year old female. HPI   38yr old female presents for f/u on her recent labs. Hypothyroidism, taking levothyroxine 112mcg po daily  Anemia, taking iron supplements otc.      History/Other:   Review of System This Encounter      FLULAVAL INFLUENZA VACCINE QUAD PRESERVATIVE FREE 0.5 ML      Meds This Visit:  Requested Prescriptions     Signed Prescriptions Disp Refills   • levothyroxine 112 MCG Oral Tab 90 tablet 1     Sig: Take 1 tablet (112 mcg total) by mouth

## 2021-10-18 NOTE — PATIENT INSTRUCTIONS
Anemia  Anemia is a condition that occurs when your body does not have enough healthy red blood cells (RBCs). RBCs are the parts of your blood that carry oxygen all over your body.  A protein called hemoglobin allows your RBCs to absorb and release oxygen shape of your blood cells. To do the test, a drop of your blood is looked at under a microscope. A stain is used to make the blood cells easier to see. · Iron studies. These tests measure the amount of iron in your blood.  Your body needs iron to make hemo intended as a substitute for professional medical care. Always follow your healthcare professional's instructions. Iron Supplements  Most people think of iron as a metal used to make pots, frying pans, and soup kettles.  This same metal (or mineral) added stool softener. · Eat a healthy diet to get all the nutrients your body needs. Caution  The amount of iron each person needs is different, and varies by age. Women who are pregnant or breastfeeding need extra iron.  But taking more than the suggeste

## 2021-12-08 DIAGNOSIS — E55.9 VITAMIN D DEFICIENCY: ICD-10-CM

## 2021-12-08 RX ORDER — ERGOCALCIFEROL 1.25 MG/1
50000 CAPSULE ORAL WEEKLY
Qty: 12 CAPSULE | Refills: 0 | OUTPATIENT
Start: 2021-12-08

## 2021-12-08 NOTE — TELEPHONE ENCOUNTER
ergocalciferol 1.25 MG (11506 UT) Oral Cap          Sig: Take 1 capsule (50,000 Units total) by mouth once a week.     Disp:  12 capsule    Refills:  0    Start: 12/8/2021    Class: Normal    Non-formulary      LOV 10/18/21     LAST LAB   9/24/21      LAST

## 2022-04-11 DIAGNOSIS — E03.9 ACQUIRED HYPOTHYROIDISM: ICD-10-CM

## 2022-04-11 RX ORDER — LEVOTHYROXINE SODIUM 112 UG/1
TABLET ORAL
Qty: 90 TABLET | Refills: 0 | Status: SHIPPED | OUTPATIENT
Start: 2022-04-11 | End: 2022-05-04

## 2022-04-11 NOTE — TELEPHONE ENCOUNTER
Thyroid Supplements Protocol Passed 04/11/2022 06:30 AM   Protocol Details  TSH test in past 12 months    TSH value between 0.350 and 5.500 IU/ml    Appointment in past 12 or next 3 months        LOV 10/18/21     LAST LAB  9/24/21     LAST RX 10/18/21 90 with 1     Next OV No future appointments.       PROTOCOL pass   Please schedule annual physical .

## 2022-05-04 ENCOUNTER — PATIENT MESSAGE (OUTPATIENT)
Dept: FAMILY MEDICINE CLINIC | Facility: CLINIC | Age: 42
End: 2022-05-04

## 2022-05-04 NOTE — TELEPHONE ENCOUNTER
From: Dannielle Bellamy  To: Viviane Morrow DO  Sent: 5/4/2022 6:35 AM CDT  Subject: Prescription refill     Hi, I need a prescription refill for my Levothyroxine. Can you please send it to the [facility] on [address]? Thanks.

## 2022-05-05 RX ORDER — LEVOTHYROXINE SODIUM 112 UG/1
112 TABLET ORAL
Qty: 90 TABLET | Refills: 0 | Status: SHIPPED | OUTPATIENT
Start: 2022-05-05

## 2022-05-05 NOTE — TELEPHONE ENCOUNTER
Thyroid Supplements Protocol Passed 05/05/2022 11:25 AM   Protocol Details  TSH test in past 12 months    TSH value between 0.350 and 5.500 IU/ml    Appointment in past 12 or next 3 months        4/11/22 90 tabs

## 2022-05-05 NOTE — TELEPHONE ENCOUNTER
Pt called to sp w/ nurse, said she wants to repeat labs in Sept 1yr after her last labs, said she will do physical and labs together in sept, pt asking if order can be sent for thyroid b/w so she can get refills until her next physical appt

## 2022-06-01 DIAGNOSIS — E55.9 VITAMIN D DEFICIENCY: ICD-10-CM

## 2022-06-02 ENCOUNTER — PATIENT MESSAGE (OUTPATIENT)
Dept: FAMILY MEDICINE CLINIC | Facility: CLINIC | Age: 42
End: 2022-06-02

## 2022-06-02 RX ORDER — ERGOCALCIFEROL 1.25 MG/1
CAPSULE ORAL
Qty: 12 CAPSULE | Refills: 0 | OUTPATIENT
Start: 2022-06-02

## 2022-07-07 ENCOUNTER — PATIENT MESSAGE (OUTPATIENT)
Dept: FAMILY MEDICINE CLINIC | Facility: CLINIC | Age: 42
End: 2022-07-07

## 2022-07-08 DIAGNOSIS — E03.9 ACQUIRED HYPOTHYROIDISM: ICD-10-CM

## 2022-07-11 RX ORDER — LEVOTHYROXINE SODIUM 112 UG/1
TABLET ORAL
Qty: 90 TABLET | Refills: 0 | Status: SHIPPED | OUTPATIENT
Start: 2022-07-11

## 2022-07-11 NOTE — TELEPHONE ENCOUNTER
Thyroid Supplements Protocol Passed 07/08/2022 09:27 AM   Protocol Details  TSH test in past 12 months    TSH value between 0.350 and 5.500 IU/ml    Appointment in past 12 or next 3 months     LOV 10/18/21     LAST LAB  9/24/21     LAST RX 5/5/22 90     Next OV   Future Appointments   Date Time Provider Billy Geronimo   8/29/2022 11:40 AM Edgar Garcia DO [facility]         PROTOCOL pass

## 2022-08-10 ENCOUNTER — PATIENT MESSAGE (OUTPATIENT)
Dept: FAMILY MEDICINE CLINIC | Facility: CLINIC | Age: 42
End: 2022-08-10

## 2022-08-10 NOTE — TELEPHONE ENCOUNTER
From: Dixie Swain  To: rAian High DO  Sent: 8/10/2022 9:02 AM CDT  Subject: Prescription refill    I had requested for a refill of my Levothyroxine until my appointment for annual physical on Aug 29. The prescription was refilled only for 30 days back in July. I need a refill until atleast i get bloodwork results back, after the appointment.

## 2022-08-29 ENCOUNTER — OFFICE VISIT (OUTPATIENT)
Dept: FAMILY MEDICINE CLINIC | Facility: CLINIC | Age: 42
End: 2022-08-29
Payer: COMMERCIAL

## 2022-08-29 VITALS
BODY MASS INDEX: 27.16 KG/M2 | OXYGEN SATURATION: 97 % | TEMPERATURE: 98 F | RESPIRATION RATE: 16 BRPM | SYSTOLIC BLOOD PRESSURE: 106 MMHG | WEIGHT: 136.5 LBS | HEIGHT: 59.61 IN | DIASTOLIC BLOOD PRESSURE: 62 MMHG | HEART RATE: 67 BPM

## 2022-08-29 DIAGNOSIS — E55.9 VITAMIN D DEFICIENCY: ICD-10-CM

## 2022-08-29 DIAGNOSIS — Z12.31 VISIT FOR SCREENING MAMMOGRAM: ICD-10-CM

## 2022-08-29 DIAGNOSIS — Z78.9 VEGETARIAN: ICD-10-CM

## 2022-08-29 DIAGNOSIS — Z00.00 LABORATORY EXAM ORDERED AS PART OF ROUTINE GENERAL MEDICAL EXAMINATION: ICD-10-CM

## 2022-08-29 DIAGNOSIS — D64.9 ANEMIA, UNSPECIFIED TYPE: ICD-10-CM

## 2022-08-29 DIAGNOSIS — Z23 NEED FOR VACCINATION: ICD-10-CM

## 2022-08-29 DIAGNOSIS — Z00.00 WELL WOMAN EXAM (NO GYNECOLOGICAL EXAM): Primary | ICD-10-CM

## 2022-08-29 DIAGNOSIS — E03.9 ACQUIRED HYPOTHYROIDISM: ICD-10-CM

## 2022-08-29 PROCEDURE — 3078F DIAST BP <80 MM HG: CPT | Performed by: FAMILY MEDICINE

## 2022-08-29 PROCEDURE — 99396 PREV VISIT EST AGE 40-64: CPT | Performed by: FAMILY MEDICINE

## 2022-08-29 PROCEDURE — 3008F BODY MASS INDEX DOCD: CPT | Performed by: FAMILY MEDICINE

## 2022-08-29 PROCEDURE — 99213 OFFICE O/P EST LOW 20 MIN: CPT | Performed by: FAMILY MEDICINE

## 2022-08-29 PROCEDURE — 3074F SYST BP LT 130 MM HG: CPT | Performed by: FAMILY MEDICINE

## 2022-08-29 PROCEDURE — 90471 IMMUNIZATION ADMIN: CPT | Performed by: FAMILY MEDICINE

## 2022-08-29 PROCEDURE — 90715 TDAP VACCINE 7 YRS/> IM: CPT | Performed by: FAMILY MEDICINE

## 2022-08-31 LAB
ABSOLUTE BASOPHILS: 38 CELLS/UL (ref 0–200)
ABSOLUTE EOSINOPHILS: 140 CELLS/UL (ref 15–500)
ABSOLUTE LYMPHOCYTES: 2176 CELLS/UL (ref 850–3900)
ABSOLUTE MONOCYTES: 340 CELLS/UL (ref 200–950)
ABSOLUTE NEUTROPHILS: 2705 CELLS/UL (ref 1500–7800)
ALBUMIN/GLOBULIN RATIO: 1.7 (CALC) (ref 1–2.5)
ALBUMIN: 4.2 G/DL (ref 3.6–5.1)
ALKALINE PHOSPHATASE: 42 U/L (ref 31–125)
ALT: 9 U/L (ref 6–29)
AST: 14 U/L (ref 10–30)
BASOPHILS: 0.7 %
BILIRUBIN, TOTAL: 0.5 MG/DL (ref 0.2–1.2)
BUN: 11 MG/DL (ref 7–25)
CALCIUM: 9.2 MG/DL (ref 8.6–10.2)
CARBON DIOXIDE: 28 MMOL/L (ref 20–32)
CHLORIDE: 109 MMOL/L (ref 98–110)
CHOL/HDLC RATIO: 2.7 (CALC)
CHOLESTEROL, TOTAL: 149 MG/DL
CREATININE: 0.73 MG/DL (ref 0.5–0.99)
EGFR: 104 ML/MIN/1.73M2
EOSINOPHILS: 2.6 %
GLOBULIN: 2.5 G/DL (CALC) (ref 1.9–3.7)
GLUCOSE: 97 MG/DL (ref 65–99)
HDL CHOLESTEROL: 55 MG/DL
HEMATOCRIT: 34.5 % (ref 35–45)
HEMOGLOBIN: 11.5 G/DL (ref 11.7–15.5)
LDL-CHOLESTEROL: 79 MG/DL (CALC)
LYMPHOCYTES: 40.3 %
MCH: 30.8 PG (ref 27–33)
MCHC: 33.3 G/DL (ref 32–36)
MCV: 92.5 FL (ref 80–100)
MONOCYTES: 6.3 %
MPV: 11 FL (ref 7.5–12.5)
NEUTROPHILS: 50.1 %
NON-HDL CHOLESTEROL: 94 MG/DL (CALC)
PLATELET COUNT: 245 THOUSAND/UL (ref 140–400)
POTASSIUM: 4.7 MMOL/L (ref 3.5–5.3)
PROTEIN, TOTAL: 6.7 G/DL (ref 6.1–8.1)
RDW: 11.5 % (ref 11–15)
RED BLOOD CELL COUNT: 3.73 MILLION/UL (ref 3.8–5.1)
SODIUM: 141 MMOL/L (ref 135–146)
TRIGLYCERIDES: 71 MG/DL
TSH W/REFLEX TO FT4: 0.43 MIU/L
VITAMIN B12: 440 PG/ML (ref 200–1100)
VITAMIN D, 25-OH, TOTAL: 23 NG/ML (ref 30–100)
WHITE BLOOD CELL COUNT: 5.4 THOUSAND/UL (ref 3.8–10.8)

## 2022-09-02 ENCOUNTER — HOSPITAL ENCOUNTER (OUTPATIENT)
Dept: MAMMOGRAPHY | Age: 42
Discharge: HOME | End: 2022-09-02
Attending: FAMILY MEDICINE
Payer: COMMERCIAL

## 2022-09-02 DIAGNOSIS — Z12.31 VISIT FOR SCREENING MAMMOGRAM: ICD-10-CM

## 2022-09-07 ENCOUNTER — TELEPHONE (OUTPATIENT)
Dept: FAMILY MEDICINE CLINIC | Facility: CLINIC | Age: 42
End: 2022-09-07

## 2022-09-07 DIAGNOSIS — E03.9 ACQUIRED HYPOTHYROIDISM: ICD-10-CM

## 2022-09-07 RX ORDER — LEVOTHYROXINE SODIUM 112 UG/1
112 TABLET ORAL
Qty: 90 TABLET | Refills: 1 | Status: SHIPPED | OUTPATIENT
Start: 2022-09-07

## 2022-09-07 NOTE — TELEPHONE ENCOUNTER
----- Message from [facility], DO sent at 9/6/2022 12:41 PM CDT -----  Pls inform pt that labs are stable. Anemia similar to previous. Vit d is low, she should be taking vit d3 2000iu daily otc. TSH wnl, cont current dose of levothyroxine.

## 2022-09-08 ENCOUNTER — HOSPITAL ENCOUNTER (OUTPATIENT)
Dept: MAMMOGRAPHY | Age: 42
Discharge: HOME | End: 2022-09-08
Attending: FAMILY MEDICINE
Payer: COMMERCIAL

## 2022-09-08 PROCEDURE — 77063 BREAST TOMOSYNTHESIS BI: CPT | Performed by: FAMILY MEDICINE

## 2022-09-08 PROCEDURE — 77067 SCR MAMMO BI INCL CAD: CPT | Performed by: FAMILY MEDICINE

## 2022-09-09 ENCOUNTER — TELEPHONE (OUTPATIENT)
Dept: FAMILY MEDICINE CLINIC | Facility: CLINIC | Age: 42
End: 2022-09-09

## 2022-09-09 NOTE — TELEPHONE ENCOUNTER
Pt was told she will need additional testing after having her mammogram, pt would like to sp w/  about results before going for additional test

## 2022-09-09 NOTE — TELEPHONE ENCOUNTER
Patient was contacted and we discussed the different between additional views vs the ultrasound that was ordered in 2019. Patient is concerned because last time she did additional views (ultrasound) she had bill of $3000. She was advised to speak with the mammogram department on how additional views are charged as clinical is unsure being additional views are diagnostic and unsure of patients insurance benefits coverage. She does have hx of left breast cyst but advised may be necessary to make sure no changes to appearance of cyst. She would like to get input of Dr. Pawan Waller prior to completing (she will schedule for end of next week to have time for response) as she wants to make sure she is not repeating testing for nothing.

## 2022-09-14 NOTE — TELEPHONE ENCOUNTER
Would agree with radiology if requesting additional views/US to make sure there are no new findings.  May call dept to see cost associated with additional testing

## 2022-10-14 ENCOUNTER — HOSPITAL ENCOUNTER (OUTPATIENT)
Dept: ULTRASOUND IMAGING | Age: 42
Discharge: HOME | End: 2022-10-14
Attending: FAMILY MEDICINE
Payer: COMMERCIAL

## 2022-10-14 ENCOUNTER — HOSPITAL ENCOUNTER (OUTPATIENT)
Dept: MAMMOGRAPHY | Age: 42
Discharge: HOME | End: 2022-10-14
Attending: FAMILY MEDICINE
Payer: COMMERCIAL

## 2022-10-14 DIAGNOSIS — R92.2 INCONCLUSIVE MAMMOGRAM: ICD-10-CM

## 2022-10-14 PROCEDURE — 76642 ULTRASOUND BREAST LIMITED: CPT | Performed by: FAMILY MEDICINE

## 2022-10-14 PROCEDURE — 77065 DX MAMMO INCL CAD UNI: CPT | Performed by: FAMILY MEDICINE

## 2022-10-14 PROCEDURE — 77061 BREAST TOMOSYNTHESIS UNI: CPT | Performed by: FAMILY MEDICINE

## 2023-01-11 ENCOUNTER — TELEPHONE (OUTPATIENT)
Dept: FAMILY MEDICINE CLINIC | Facility: CLINIC | Age: 43
End: 2023-01-11

## 2023-01-11 NOTE — TELEPHONE ENCOUNTER
Pt. Calling to report billing issue. Pt. Stated she received bill from [employer] B-12 was not covered by ins as it was ordered as part of annual pe. Advised pt.-will check coding with provider and f/u with update.

## 2023-01-13 DIAGNOSIS — D64.9 ANEMIA, UNSPECIFIED TYPE: ICD-10-CM

## 2023-01-13 DIAGNOSIS — E55.9 VITAMIN D DEFICIENCY: ICD-10-CM

## 2023-01-13 RX ORDER — MELATONIN
325
Refills: 0 | Status: CANCELLED | OUTPATIENT
Start: 2023-01-13

## 2023-01-13 RX ORDER — ERGOCALCIFEROL 1.25 MG/1
50000 CAPSULE ORAL WEEKLY
Qty: 12 CAPSULE | Refills: 0 | Status: CANCELLED | OUTPATIENT
Start: 2023-01-13

## 2023-02-08 NOTE — TELEPHONE ENCOUNTER
Pt called for an update regarding the charge for B12 lab. Previous B12 labs were covered. Would like a call back regarding this matter. Pt received a final notice regarding this bill.

## 2023-03-15 DIAGNOSIS — E03.9 ACQUIRED HYPOTHYROIDISM: ICD-10-CM

## 2023-03-16 RX ORDER — LEVOTHYROXINE SODIUM 112 UG/1
TABLET ORAL
Qty: 90 TABLET | Refills: 1 | Status: SHIPPED | OUTPATIENT
Start: 2023-03-16

## 2023-03-16 NOTE — TELEPHONE ENCOUNTER
Thyroid Supplements Protocol Passed 03/15/2023 01:39 PM   Protocol Details  TSH test in past 12 months    TSH value between 0.350 and 5.500 IU/ml    Appointment in past 12 or next 3 months     LOV 8/29/22     LAST LAB  8/30/22     LAST RX  9/7/22 90 with 1      Next OV No future appointments.       PROTOCOL pass

## 2023-04-21 NOTE — TELEPHONE ENCOUNTER
Pt called for an update. . I routed it to [facility] when she was still here. It looks like the message was not routed to anyone else. Please read previous TE's. Please call pt back by Wednesday with an answer.

## 2023-04-27 NOTE — TELEPHONE ENCOUNTER
Left message to call back. Please let patient know to give D64.9 code to [facility] for B12 lab. That is the only difference is from the last lab done.

## 2023-08-07 ENCOUNTER — OFFICE VISIT (OUTPATIENT)
Dept: FAMILY MEDICINE CLINIC | Facility: CLINIC | Age: 43
End: 2023-08-07
Payer: COMMERCIAL

## 2023-08-07 VITALS
WEIGHT: 138.5 LBS | TEMPERATURE: 98 F | RESPIRATION RATE: 16 BRPM | DIASTOLIC BLOOD PRESSURE: 60 MMHG | HEIGHT: 59.45 IN | OXYGEN SATURATION: 99 % | HEART RATE: 69 BPM | SYSTOLIC BLOOD PRESSURE: 104 MMHG | BODY MASS INDEX: 27.55 KG/M2

## 2023-08-07 DIAGNOSIS — E55.9 VITAMIN D DEFICIENCY: ICD-10-CM

## 2023-08-07 DIAGNOSIS — Z12.31 VISIT FOR SCREENING MAMMOGRAM: ICD-10-CM

## 2023-08-07 DIAGNOSIS — N92.6 IRREGULAR MENSES: ICD-10-CM

## 2023-08-07 DIAGNOSIS — Z78.9 VEGETARIAN: ICD-10-CM

## 2023-08-07 DIAGNOSIS — Z13.21 ENCOUNTER FOR VITAMIN DEFICIENCY SCREENING: ICD-10-CM

## 2023-08-07 DIAGNOSIS — E03.9 ACQUIRED HYPOTHYROIDISM: ICD-10-CM

## 2023-08-07 DIAGNOSIS — Z00.00 LABORATORY EXAM ORDERED AS PART OF ROUTINE GENERAL MEDICAL EXAMINATION: ICD-10-CM

## 2023-08-07 DIAGNOSIS — Z12.11 SCREENING FOR COLON CANCER: ICD-10-CM

## 2023-08-07 DIAGNOSIS — Z01.419 WELL WOMAN EXAM WITH ROUTINE GYNECOLOGICAL EXAM: Primary | ICD-10-CM

## 2023-08-07 PROCEDURE — 3008F BODY MASS INDEX DOCD: CPT | Performed by: FAMILY MEDICINE

## 2023-08-07 PROCEDURE — 3074F SYST BP LT 130 MM HG: CPT | Performed by: FAMILY MEDICINE

## 2023-08-07 PROCEDURE — 99213 OFFICE O/P EST LOW 20 MIN: CPT | Performed by: FAMILY MEDICINE

## 2023-08-07 PROCEDURE — 99396 PREV VISIT EST AGE 40-64: CPT | Performed by: FAMILY MEDICINE

## 2023-08-07 PROCEDURE — 3078F DIAST BP <80 MM HG: CPT | Performed by: FAMILY MEDICINE

## 2023-08-07 RX ORDER — MULTIVITAMIN WITH IRON
250 TABLET ORAL
COMMUNITY

## 2023-08-07 RX ORDER — ZINC GLUCONATE 50 MG
TABLET ORAL
COMMUNITY

## 2023-08-19 LAB
ABSOLUTE BASOPHILS: 28 CELLS/UL (ref 0–200)
ABSOLUTE EOSINOPHILS: 156 CELLS/UL (ref 15–500)
ABSOLUTE LYMPHOCYTES: 1766 CELLS/UL (ref 850–3900)
ABSOLUTE MONOCYTES: 299 CELLS/UL (ref 200–950)
ABSOLUTE NEUTROPHILS: 2351 CELLS/UL (ref 1500–7800)
ALBUMIN/GLOBULIN RATIO: 1.5 (CALC) (ref 1–2.5)
ALBUMIN: 4.2 G/DL (ref 3.6–5.1)
ALKALINE PHOSPHATASE: 49 U/L (ref 31–125)
ALT: 9 U/L (ref 6–29)
AST: 16 U/L (ref 10–35)
BASOPHILS: 0.6 %
BILIRUBIN, TOTAL: 0.5 MG/DL (ref 0.2–1.2)
BUN: 11 MG/DL (ref 7–25)
CALCIUM: 9.4 MG/DL (ref 8.6–10.2)
CARBON DIOXIDE: 27 MMOL/L (ref 20–32)
CHLORIDE: 107 MMOL/L (ref 98–110)
CHOL/HDLC RATIO: 3 (CALC)
CHOLESTEROL, TOTAL: 172 MG/DL
CREATININE: 0.7 MG/DL (ref 0.5–0.99)
EGFR: 109 ML/MIN/1.73M2
EOSINOPHILS: 3.4 %
FSH: 10.6 MIU/ML
GLOBULIN: 2.8 G/DL (CALC) (ref 1.9–3.7)
GLUCOSE: 92 MG/DL (ref 65–99)
HDL CHOLESTEROL: 58 MG/DL
HEMATOCRIT: 37.3 % (ref 35–45)
HEMOGLOBIN: 12.1 G/DL (ref 11.7–15.5)
LDL-CHOLESTEROL: 95 MG/DL (CALC)
LH: 3.7 MIU/ML
LYMPHOCYTES: 38.4 %
MCH: 30.1 PG (ref 27–33)
MCHC: 32.4 G/DL (ref 32–36)
MCV: 92.8 FL (ref 80–100)
MONOCYTES: 6.5 %
MPV: 10.2 FL (ref 7.5–12.5)
NEUTROPHILS: 51.1 %
NON-HDL CHOLESTEROL: 114 MG/DL (CALC)
PLATELET COUNT: 280 THOUSAND/UL (ref 140–400)
POTASSIUM: 4.9 MMOL/L (ref 3.5–5.3)
PROTEIN, TOTAL: 7 G/DL (ref 6.1–8.1)
RDW: 11.3 % (ref 11–15)
RED BLOOD CELL COUNT: 4.02 MILLION/UL (ref 3.8–5.1)
SODIUM: 142 MMOL/L (ref 135–146)
T3, FREE: 3 PG/ML (ref 2.3–4.2)
T4, FREE: 1.3 NG/DL (ref 0.8–1.8)
TRIGLYCERIDES: 96 MG/DL
TSH W/REFLEX TO FT4: 0.82 MIU/L
TSH: 0.82 MIU/L
VITAMIN B12: 783 PG/ML (ref 200–1100)
VITAMIN D, 25-OH, TOTAL: 32 NG/ML (ref 30–100)
WHITE BLOOD CELL COUNT: 4.6 THOUSAND/UL (ref 3.8–10.8)

## 2023-08-23 LAB
HPV I/H RISK 1 DNA SPEC QL NAA+PROBE: NEGATIVE
PAP HISTORY (OTHER THAN LAST PAP): NORMAL

## 2023-10-16 DIAGNOSIS — E03.9 ACQUIRED HYPOTHYROIDISM: ICD-10-CM

## 2023-10-16 RX ORDER — LEVOTHYROXINE SODIUM 112 UG/1
TABLET ORAL
Qty: 90 TABLET | Refills: 1 | Status: SHIPPED | OUTPATIENT
Start: 2023-10-16

## 2023-10-16 NOTE — TELEPHONE ENCOUNTER
Thyroid Supplements Protocol [id number]/16/2023 12:35 PM   Protocol Details TSH test in past 12 months    TSH value between 0.350 and 5.500 IU/ml    Appointment in past 12 or next 3 months        LOV 8/7/23     LAST LAB 8/19/23     LAST RX  3/16/23 90 with 1     Next OV No future appointments.       PROTOCOL pass

## 2024-01-31 DIAGNOSIS — E03.9 ACQUIRED HYPOTHYROIDISM: ICD-10-CM

## 2024-01-31 RX ORDER — LEVOTHYROXINE SODIUM 112 UG/1
112 TABLET ORAL
Qty: 90 TABLET | Refills: 1 | Status: SHIPPED | OUTPATIENT
Start: 2024-01-31

## 2024-01-31 NOTE — TELEPHONE ENCOUNTER
[facility] .      Thyroid Supplements Protocol [id number]/31/2024 08:34 AM   Protocol Details TSH test in past 12 months    TSH value between 0.350 and 5.500 IU/ml    Appointment in past 12 or next 3 months      LOV 8/7/23     LAST LAB  8/19/23     LAST RX 10/16/23 90 with 1     Next OV No future appointments.      PROTOCOL pass

## 2024-05-13 ENCOUNTER — OFFICE VISIT (OUTPATIENT)
Dept: FAMILY MEDICINE CLINIC | Facility: CLINIC | Age: 44
End: 2024-05-13
Payer: COMMERCIAL

## 2024-05-13 VITALS
DIASTOLIC BLOOD PRESSURE: 62 MMHG | RESPIRATION RATE: 16 BRPM | TEMPERATURE: 99 F | HEIGHT: 59.57 IN | BODY MASS INDEX: 29.49 KG/M2 | SYSTOLIC BLOOD PRESSURE: 104 MMHG | HEART RATE: 68 BPM | WEIGHT: 148.25 LBS | OXYGEN SATURATION: 99 %

## 2024-05-13 DIAGNOSIS — R11.2 NAUSEA AND VOMITING, UNSPECIFIED VOMITING TYPE: Primary | ICD-10-CM

## 2024-05-13 DIAGNOSIS — K21.00 GASTROESOPHAGEAL REFLUX DISEASE WITH ESOPHAGITIS WITHOUT HEMORRHAGE: ICD-10-CM

## 2024-05-13 PROCEDURE — 99213 OFFICE O/P EST LOW 20 MIN: CPT | Performed by: FAMILY MEDICINE

## 2024-05-13 RX ORDER — OMEPRAZOLE 40 MG/1
40 CAPSULE, DELAYED RELEASE ORAL
Qty: 30 CAPSULE | Refills: 0 | Status: SHIPPED | OUTPATIENT
Start: 2024-05-13

## 2024-05-13 NOTE — PROGRESS NOTES
Subjective:   Patient ID: Uma Sky is a 44 year old female.    HPI  44yr old female presents for c/o burning sensation and sore throat after an episode of emesis yesterday. States she was taking her magnesium and zinc supplements yesterday and just immediately started coughing and choking. She vomited everything. States she lost her voice. Had a burning sensation in her throat for about 3-4hrs. Took Prilosec and it seemed to get a little better. Still was unable to talk last night. Was unable to sleep due to discomfort. Took Tylenol early morning. States the burning sensation is almost gone and her throat just feels sore. Denies any GERD symptoms otherwise. No abdominal pain.      History/Other:   Review of Systems   HENT:  Positive for sore throat. Negative for trouble swallowing.    Respiratory:  Negative for cough.    Gastrointestinal:  Negative for abdominal pain, nausea and vomiting.     Current Outpatient Medications   Medication Sig Dispense Refill    Omeprazole 40 MG Oral Capsule Delayed Release Take 1 capsule (40 mg total) by mouth daily with breakfast. 30 capsule 0    levothyroxine 112 MCG Oral Tab Take 1 tablet (112 mcg total) by mouth before breakfast. 90 tablet 1    magnesium 250 MG Oral Tab Take 1 tablet (250 mg total) by mouth.      Zinc 50 MG Oral Tab Take by mouth.      Cholecalciferol 125 MCG (5000 UT) Oral Tab Take 1 tablet (5,000 Units total) by mouth daily.      PARAGARD INTRAUTERINE COPPER Intrauterine IUD by Intrauterine route.       Allergies:No Known Allergies    Objective:   Physical Exam  Vitals and nursing note reviewed.   Constitutional:       Appearance: Normal appearance.   HENT:      Head: Normocephalic and atraumatic.      Mouth/Throat:      Mouth: Mucous membranes are moist.      Pharynx: No posterior oropharyngeal erythema.   Cardiovascular:      Rate and Rhythm: Normal rate and regular rhythm.   Pulmonary:      Effort: Pulmonary effort is normal.      Breath sounds: Normal  breath sounds. No wheezing, rhonchi or rales.   Skin:     General: Skin is warm and dry.   Neurological:      Mental Status: She is alert.   Psychiatric:         Mood and Affect: Mood normal.         Behavior: Behavior normal.         Assessment & Plan:   1. Nausea and vomiting, unspecified vomiting type    2. Gastroesophageal reflux disease with esophagitis without hemorrhage    - will start omeprazole 40mg po qam 30min before breakfast for about 1-2wks to help with symptoms, may discontinue sooner  - avoid inciting foods, francesco caffeine-containing foods, alcohol, mints, spicy/oily foods, citrus, etc  - avoid eating or drinking late night and keep head elevated  - monitor symptoms  - she understands and agrees with tx plan  - RTC as needed       No orders of the defined types were placed in this encounter.      Meds This Visit:  Requested Prescriptions     Signed Prescriptions Disp Refills    Omeprazole 40 MG Oral Capsule Delayed Release 30 capsule 0     Sig: Take 1 capsule (40 mg total) by mouth daily with breakfast.       Imaging & Referrals:  None

## 2024-06-22 DIAGNOSIS — K21.00 GASTROESOPHAGEAL REFLUX DISEASE WITH ESOPHAGITIS WITHOUT HEMORRHAGE: ICD-10-CM

## 2024-06-25 RX ORDER — OMEPRAZOLE 40 MG/1
40 CAPSULE, DELAYED RELEASE ORAL
Qty: 90 CAPSULE | Refills: 3 | Status: SHIPPED | OUTPATIENT
Start: 2024-06-25

## 2024-06-25 NOTE — TELEPHONE ENCOUNTER
Refill Per Protocol     Requested Prescriptions   Pending Prescriptions Disp Refills    OMEPRAZOLE 40 MG Oral Capsule Delayed Release [Pharmacy Med Name: OMEPRAZOLE 40MG CAPSULES] 30 capsule 0     Sig: TAKE 1 CAPSULE(40 MG) BY MOUTH DAILY WITH BREAKFAST       Gastrointestional Medication Protocol Passed - 6/22/2024  8:56 AM        Passed - In person appointment or virtual visit in the past 12 mos or appointment in next 3 mos     Recent Outpatient Visits              1 month ago Nausea and vomiting, unspecified vomiting type    [facility], [address] [place] [address], Shaheen Feliz, DO    Office Visit    10 months ago Well woman exam with routine gynecological exam    [address], Shaheen Ambrosea, DO    Office Visit    1 year ago Well woman exam (no gynecological exam)    [facility], [address] [place] [address], Shaheen Ambrosea, DO    Office Visit    2 years ago Acquired hypothyroidism    [address], Shaheen Feliz, DO    Office Visit    2 years ago Acquired hypothyroidism    [facility], [address] [place] [address], Shaheen Ambrosea, DO    Office Visit                               Recent Outpatient Visits              1 month ago Nausea and vomiting, unspecified vomiting type    [facility], [address] [place] [address], Shaheen Ambrosea, DO    Office Visit    10 months ago Well woman exam with routine gynecological exam    [address], Shaheen Ambrosea, DO    Office Visit    1 year ago Well woman exam (no gynecological exam)    [facility], [address] [place] [address], Shaheen Ambrosea, DO    Office Visit    2 years ago Acquired hypothyroidism    [address], Vandanaineb Trini, DO    Office Visit    2 years ago Acquired  hypothyroidism    [facility], [address], [facility], Shaheen Feliz,     Office Visit

## 2024-09-05 DIAGNOSIS — E03.9 ACQUIRED HYPOTHYROIDISM: ICD-10-CM

## 2024-09-09 RX ORDER — LEVOTHYROXINE SODIUM 112 UG/1
112 TABLET ORAL
Qty: 30 TABLET | Refills: 0 | Status: SHIPPED | OUTPATIENT
Start: 2024-09-09

## 2024-09-09 NOTE — TELEPHONE ENCOUNTER
Please Review. Protocol Failed; No Protocol   No Active/ Future labs pended    Requested Prescriptions   Pending Prescriptions Disp Refills    LEVOTHYROXINE 112 MCG Oral Tab [Pharmacy Med Name: Levothyroxine Sodium 112mcg Tablet] 90 tablet 0     Sig: Take 1 tablet by mouth daily before breakfast.       Thyroid Medication Protocol Failed - 9/9/2024  2:15 PM        Failed - TSH in past 12 months        Passed - Last TSH value is normal     Lab Results   Component Value Date    TSH 0.82 08/18/2023    TSHT4 0.82 08/18/2023                 Passed - In person appointment or virtual visit in the past 12 mos or appointment in next 3 mos     Recent Outpatient Visits              3 months ago Nausea and vomiting, unspecified vomiting type    [facility], [address] [place] [address], Shaheen Feliz, DO    Office Visit    1 year ago Well woman exam with routine gynecological exam    [address], Shaheen Ambrosea, DO    Office Visit    2 years ago Well woman exam (no gynecological exam)    [address], Shaheen Ambrosea, DO    Office Visit    2 years ago Acquired hypothyroidism    [address], Shaheen Ambrosea, DO    Office Visit    3 years ago Acquired hypothyroidism    [facility], [address] [place] [address], Shaheen Ambrosea, DO    Office Visit                               Recent Outpatient Visits              3 months ago Nausea and vomiting, unspecified vomiting type    [facility], [address] [place] [address], Shaheen Ambrosea, DO    Office Visit    1 year ago Well woman exam with routine gynecological exam    [address], Shaheen Ambrosea, DO    Office Visit    2 years ago Well woman exam (no gynecological exam)    [facility], [address] [place] [address],  Shaheen Feliz, DO    Office Visit    2 years ago Acquired hypothyroidism    [facility], [address] [place] [address], Shaheen Feliz, DO    Office Visit    3 years ago Acquired hypothyroidism    [facility], [address] [place] [address], Shaheen Feliz, DO    Office Visit

## 2024-10-05 DIAGNOSIS — E03.9 ACQUIRED HYPOTHYROIDISM: ICD-10-CM

## 2024-10-07 NOTE — TELEPHONE ENCOUNTER
Please review. Protocol Failed; No Protocol    Requested Prescriptions   Pending Prescriptions Disp Refills    LEVOTHYROXINE 112 MCG Oral Tab [Pharmacy Med Name: Levothyroxine Sodium 112mcg Tablet] 30 tablet 0     Sig: Take 1 tablet by mouth before breakfast. Due for an appointment.       Thyroid Medication Protocol Failed - 10/5/2024 11:06 AM        Failed - TSH in past 12 months        Passed - Last TSH value is normal     Lab Results   Component Value Date    TSH 0.82 08/18/2023    TSHT4 0.82 08/18/2023                 Passed - In person appointment or virtual visit in the past 12 mos or appointment in next 3 mos     Recent Outpatient Visits              4 months ago Nausea and vomiting, unspecified vomiting type    [facility], [address] [place] [address], Shaheen Feliz, DO    Office Visit    1 year ago Well woman exam with routine gynecological exam    [address], Shaheen Feliz, DO    Office Visit    2 years ago Well woman exam (no gynecological exam)    [address], Shaheen Feliz, DO    Office Visit    2 years ago Acquired hypothyroidism    [address], Shaheen Ambrosea, DO    Office Visit    3 years ago Acquired hypothyroidism    [facility], [address] [place] [address], Shaheen Ambrosea, DO    Office Visit                               Recent Outpatient Visits              4 months ago Nausea and vomiting, unspecified vomiting type    [address], Shaheen Ambrosea, DO    Office Visit    1 year ago Well woman exam with routine gynecological exam    [address]    Office Visit    2 years ago Well woman exam (no gynecological exam)    [facility], [address] [place], [place] [address]Shaheen, DO     Office Visit    2 years ago Acquired hypothyroidism    [facility], [address] [facility], Shaheen Feliz,     Office Visit    3 years ago Acquired hypothyroidism    [facility], [address] [place] [address], [facility], Shaheen Feliz, DO    Office Visit

## 2024-10-09 RX ORDER — LEVOTHYROXINE SODIUM 112 UG/1
TABLET ORAL
Qty: 30 TABLET | Refills: 0 | Status: SHIPPED | OUTPATIENT
Start: 2024-10-09

## 2024-11-11 ENCOUNTER — OFFICE VISIT (OUTPATIENT)
Dept: FAMILY MEDICINE CLINIC | Facility: CLINIC | Age: 44
End: 2024-11-11
Payer: COMMERCIAL

## 2024-11-11 VITALS
OXYGEN SATURATION: 98 % | BODY MASS INDEX: 30.14 KG/M2 | WEIGHT: 153.5 LBS | TEMPERATURE: 97 F | SYSTOLIC BLOOD PRESSURE: 120 MMHG | DIASTOLIC BLOOD PRESSURE: 70 MMHG | HEIGHT: 59.65 IN | HEART RATE: 79 BPM | RESPIRATION RATE: 16 BRPM

## 2024-11-11 DIAGNOSIS — Z83.3 FAMILY HISTORY OF DIABETES MELLITUS: ICD-10-CM

## 2024-11-11 DIAGNOSIS — Z78.9 VEGETARIAN: ICD-10-CM

## 2024-11-11 DIAGNOSIS — E55.9 VITAMIN D DEFICIENCY: ICD-10-CM

## 2024-11-11 DIAGNOSIS — Z12.31 VISIT FOR SCREENING MAMMOGRAM: ICD-10-CM

## 2024-11-11 DIAGNOSIS — Z12.11 COLON CANCER SCREENING: ICD-10-CM

## 2024-11-11 DIAGNOSIS — Z00.00 WELL WOMAN EXAM (NO GYNECOLOGICAL EXAM): Primary | ICD-10-CM

## 2024-11-11 DIAGNOSIS — N92.6 IRREGULAR MENSES: ICD-10-CM

## 2024-11-11 DIAGNOSIS — R63.5 WEIGHT GAIN: ICD-10-CM

## 2024-11-11 DIAGNOSIS — Z00.00 LABORATORY EXAM ORDERED AS PART OF ROUTINE GENERAL MEDICAL EXAMINATION: ICD-10-CM

## 2024-11-11 DIAGNOSIS — L65.9 HAIR LOSS: ICD-10-CM

## 2024-11-11 DIAGNOSIS — E03.9 ACQUIRED HYPOTHYROIDISM: ICD-10-CM

## 2024-11-11 DIAGNOSIS — Z23 NEED FOR VACCINATION: ICD-10-CM

## 2024-11-11 DIAGNOSIS — D64.9 ANEMIA, UNSPECIFIED TYPE: ICD-10-CM

## 2024-11-11 PROCEDURE — 99214 OFFICE O/P EST MOD 30 MIN: CPT | Performed by: FAMILY MEDICINE

## 2024-11-11 PROCEDURE — 90656 IIV3 VACC NO PRSV 0.5 ML IM: CPT | Performed by: FAMILY MEDICINE

## 2024-11-11 PROCEDURE — 99396 PREV VISIT EST AGE 40-64: CPT | Performed by: FAMILY MEDICINE

## 2024-11-11 PROCEDURE — 90471 IMMUNIZATION ADMIN: CPT | Performed by: FAMILY MEDICINE

## 2024-11-11 NOTE — PROGRESS NOTES
Chief Complaint   Patient presents with    Physical       HPI:  44yr old female presents for her annual physical, f/u on chronic conditions and other concerns.  Hypothyroidism, taking medication as directed.   C/o weight gain over the past couple months. States she had gone to [place] and then came back. Had some home projects, so her usual exercise routine was off. Has put on 17lbs per pt. Has also noted hair loss and irregular menses. Some months she will go q45d between periods or at other times will have them q2wks. Has been ongoing from the beginning of this year. LMP 10/29 and lasted about 5d. Has Paragard IUD in place since .   Vit d deficiency, taking otc supplement.     Review of Systems   Constitutional: Negative for fever, chills and fatigue, +wt gain  HENT: Negative for hearing loss, congestion, sore throat    Eyes: Negative for pain and visual disturbance.   Respiratory: Negative for cough, chest tightness, shortness of breath and wheezing.    Cardiovascular: Negative for chest pain, palpitations and leg swelling.   Gastrointestinal: Negative for nausea, vomiting, abdominal pain, diarrhea, blood in stool   Genitourinary: Negative for dysuria, hematuria and difficulty urinating.   Musculoskeletal: Negative for myalgias, back pain, joint swelling   Skin: Negative for color change and rash.   Neurological: Negative for dizziness, syncope, weakness, and headaches.   Hematological: Negative for adenopathy. Does not bruise/bleed easily.   Psychiatric/Behavioral: The patient is not nervous/anxious. No depression.    Patient Active Problem List   Diagnosis    Acquired hypothyroidism       Past Medical History:    Hypothyroidism     Past Surgical History:   Procedure Laterality Date      2007    Paragard, iud       Family History   Problem Relation Age of Onset    High Blood Pressure Father     Thyroid Disorder Mother 62        hypothyroidism     Social History     Socioeconomic History     Marital status:     Number of children: 2   Occupational History    Occupation: Analyst     Employer: [employer]     Comment: compliance & audit   Tobacco Use    Smoking status: Never    Smokeless tobacco: Never   Vaping Use    Vaping status: Never Used   Substance and Sexual Activity    Alcohol use: Yes     Alcohol/week: 2.0 - 3.0 standard drinks of alcohol     Types: 2 - 3 Standard drinks or equivalent per week     Comment: Occ    Drug use: Never    Sexual activity: Yes     Partners: Male     Birth control/protection: Paragard   Other Topics Concern    Caffeine Concern No    Occupational Exposure No    Sleep Concern No    Stress Concern No    Weight Concern Yes     Comment: Put on 10 lbs in the past year inspite of regular exercising    Special Diet Yes     Comment: Vegetarian    Back Care No    Exercise Yes    Seat Belt Yes    Self-Exams No   Social History Narrative    Children 13 & 11 (girls). Active with activities. No pets. Working at a bank.       Current Outpatient Medications   Medication Sig Dispense Refill    levothyroxine 112 MCG Oral Tab Take 1 tablet by mouth before breakfast. 30 tablet 0    magnesium 250 MG Oral Tab Take 1 tablet (250 mg total) by mouth.      Zinc 50 MG Oral Tab Take by mouth.      Cholecalciferol 125 MCG (5000 UT) Oral Tab Take 1 tablet (5,000 Units total) by mouth daily.      PARAGARD INTRAUTERINE COPPER Intrauterine IUD by Intrauterine route.      levothyroxine 125 MCG Oral Tab Take 1 tablet (125 mcg total) by mouth before breakfast. 60 tablet 0    Omeprazole 40 MG Oral Capsule Delayed Release Take 1 capsule (40 mg total) by mouth daily with breakfast. (Patient not taking: Reported on 11/11/2024) 90 capsule 3       Allergies[1]      Physical Exam  /70   Pulse 79   Temp 97.1 °F (36.2 °C) (Tympanic)   Resp 16   Ht 4' 11.65\" (1.515 m)   Wt 153 lb 8 oz (69.6 kg)   LMP 10/29/2024   SpO2 98%   BMI 30.34 kg/m²   Constitutional: Oriented to person, place, and  time. No distress. Obese  HEENT:  Normocephalic and atraumatic. Hearing and tympanic membranes normal.  Oropharynx is clear and moist.   Eyes: EOM are normal. PERRLA.    Neck: Supple   Cardiovascular: Normal rate, regular rhythm and intact distal pulses.  No murmur, rubs or gallops.   Pulmonary/Chest: Effort normal and breath sounds normal. No respiratory distress. No wheezes, rhonchi or rales  Abdominal: Soft. Bowel sounds are normal. Non tender, no masses, no organomegaly   : No breast swelling, discharge or bleeding. Dense breast tissue bilat. Pelvic deferred  Musculoskeletal: Normal range of motion. No edema and no tenderness.   Lymphadenopathy: No cervical adenopathy.   Neurological: Normal reflexes. No cranial nerve deficit or sensory deficit. Normal muscle tone. Coordination normal.   Skin: Skin is warm and dry. No rash noted. No erythema   Psychiatric: Normal mood and affect.     Health Maintenance:    1. Colon cancer screenin  2. Last mammogram:   3. Last Pap smear:   4. Dexa Scan: n/a  5. Immunizations:   Immunization History   Administered Date(s) Administered    Covid-19 Vaccine Pfizer 30 mcg/0.3 ml 2021, 04/10/2021, 2021    Covid-19 Vaccine Pfizer Bivalent 30mcg/0.3mL 2022    FLULAVAL 6 months & older 0.5 ml Prefilled syringe (27481) 10/18/2021    FLUZONE 6 months and older PFS 0.5 ml (31985) 2016, 2020    Influenza 2022    Influenza Vaccine, trivalent (IIV3), PF 0.5mL (07864) 2024    TDAP 2022       Osteoporosis Screening: Post menopausal women with a > 9% of fracture in the next 10 years.   Http://www.shef.ac.uk/FRAX/tool.aspx?country=9    Cervical Cancer Screening: The USPSTF recommends screening for cervical cancer in women ages 21 to 65 years with cytology (Pap smear) every 3 years or, for women ages 30 to 65 years who want to lengthen the screening interval, screening with a combination of cytology and human papillomavirus (HPV)  testing every 5 years.    Breast Cancer Screening: Yearly starting at the age of 40.  If positive family hx (first degree relative) - Annual mammography starting ten years prior to the age of the youngest family member with breast cancer (but not earlier than age 25 and not later than age 40)    Colon Cancer Screening: Starting at the age of 50.  Yearly FOBT, sigmoidoscopy every 5 years, or colonoscopy every 10 years.       A/P:  1. Well woman exam (no gynecological exam)  2. Visit for screening mammogram  - reviewed anticipatory guidance based on age  - given order for mammogram  - [facility] RADHA 2D+3D SCREENING BILAT (CPT=77067/26427); Future    3. Laboratory exam ordered as part of routine general medical examination  - check fasting labs  - CBC With Differential With Platelet  - Comp Metabolic Panel  - Lipid Panel  - Hemoglobin A1C  - TSH W Reflex To Free T4    4. Family history of diabetes mellitus  - Hemoglobin A1C    5. Colon cancer screening  - INSURE FECAL GLOBIN by IMMUNOASSAY [55718] [Q]    6. Acquired hypothyroidism  - cont levothyroxine as prescribed  - will check TSH and adjust dose accordingly  - TSH W Reflex To Free T4  - THYROID PEROXIDASE AND THYROGLOBULIN ANTIBODIES [4860] [Q]    7. Weight gain  8. Hair loss  - symptoms may all be related to menstrual irregularities vs thyroid disorder vs other   - check labs  - cont healthy eating habits and regular exercise  - CBC With Differential With Platelet  - Comp Metabolic Panel  - TSH W Reflex To Free T4  - FERRITIN [457] [Q]  - IRON AND TIBC [7573] [Q]    9. Irregular menses  - CBC With Differential With Platelet  - TSH W Reflex To Free T4  - US PELVIS W EV (CPT=76856/36017); Future  - FERRITIN [457] [Q]  - IRON AND TIBC [7573] [Q]  - ESTRADIOL [4021] [Q]  - FSH AND LH [7137] [Q]    10. Anemia, unspecified type  - CBC With Differential With Platelet    11. Vitamin D deficiency  - VITAMIN D, 25-HYDROXY [53456][Q]    12. Vegetarian  - VITAMIN B12  [927][Q]    13. Need for vaccination  - INFLUENZA VACCINE, TRI, PRESERV FREE, 0.5 ML    She understands and agrees with tx plan  RTC after completing labs, sooner if needed    Meds & Refills for this Visit:  Requested Prescriptions      No prescriptions requested or ordered in this encounter       Imaging & Consults:  INFLUENZA VACCINE, TRI, PRESERV FREE, 0.5 ML      No follow-ups on file.         [1] No Known Allergies

## 2024-11-14 LAB
% SATURATION: 31 % (CALC) (ref 16–45)
ABSOLUTE BASOPHILS: 32 CELLS/UL (ref 0–200)
ABSOLUTE EOSINOPHILS: 108 CELLS/UL (ref 15–500)
ABSOLUTE LYMPHOCYTES: 1728 CELLS/UL (ref 850–3900)
ABSOLUTE MONOCYTES: 311 CELLS/UL (ref 200–950)
ABSOLUTE NEUTROPHILS: 2322 CELLS/UL (ref 1500–7800)
ALBUMIN/GLOBULIN RATIO: 1.5 (CALC) (ref 1–2.5)
ALBUMIN: 4.1 G/DL (ref 3.6–5.1)
ALKALINE PHOSPHATASE: 53 U/L (ref 31–125)
ALT: 11 U/L (ref 6–29)
AST: 17 U/L (ref 10–35)
BASOPHILS: 0.7 %
BILIRUBIN, TOTAL: 0.5 MG/DL (ref 0.2–1.2)
BUN: 11 MG/DL (ref 7–25)
CALCIUM: 8.6 MG/DL (ref 8.6–10.2)
CARBON DIOXIDE: 26 MMOL/L (ref 20–32)
CHLORIDE: 107 MMOL/L (ref 98–110)
CHOL/HDLC RATIO: 2.8 (CALC)
CHOLESTEROL, TOTAL: 172 MG/DL
CREATININE: 0.69 MG/DL (ref 0.5–0.99)
EGFR: 108 ML/MIN/1.73M2
EOSINOPHILS: 2.4 %
ESTRADIOL: 54 PG/ML
FERRITIN: 22 NG/ML (ref 16–232)
FSH: 25.5 MIU/ML
GLOBULIN: 2.7 G/DL (CALC) (ref 1.9–3.7)
GLUCOSE: 97 MG/DL (ref 65–99)
HDL CHOLESTEROL: 62 MG/DL
HEMATOCRIT: 37.1 % (ref 35–45)
HEMOGLOBIN A1C: 5.6 % OF TOTAL HGB
HEMOGLOBIN: 11.5 G/DL (ref 11.7–15.5)
IRON BINDING CAPACITY: 294 MCG/DL (CALC) (ref 250–450)
IRON, TOTAL: 91 MCG/DL (ref 40–190)
LDL-CHOLESTEROL: 94 MG/DL (CALC)
LH: 15.7 MIU/ML
LYMPHOCYTES: 38.4 %
MCH: 29.3 PG (ref 27–33)
MCHC: 31 G/DL (ref 32–36)
MCV: 94.6 FL (ref 80–100)
MONOCYTES: 6.9 %
MPV: 12.2 FL (ref 7.5–12.5)
NEUTROPHILS: 51.6 %
NON-HDL CHOLESTEROL: 110 MG/DL (CALC)
PLATELET COUNT: 230 THOUSAND/UL (ref 140–400)
POTASSIUM: 4.1 MMOL/L (ref 3.5–5.3)
PROTEIN, TOTAL: 6.8 G/DL (ref 6.1–8.1)
RDW: 11.8 % (ref 11–15)
RED BLOOD CELL COUNT: 3.92 MILLION/UL (ref 3.8–5.1)
SODIUM: 141 MMOL/L (ref 135–146)
T4, FREE: 1.1 NG/DL (ref 0.8–1.8)
THYROGLOBULIN ANTIBODIES: <1 IU/ML
THYROID PEROXIDASE$ANTIBODIES: 22 IU/ML
TRIGLYCERIDES: 75 MG/DL
TSH W/REFLEX TO FT4: 6.91 MIU/L
VITAMIN D, 25-OH, TOTAL: 26 NG/ML (ref 30–100)
WHITE BLOOD CELL COUNT: 4.5 THOUSAND/UL (ref 3.8–10.8)

## 2024-11-18 ENCOUNTER — PATIENT MESSAGE (OUTPATIENT)
Dept: FAMILY MEDICINE CLINIC | Facility: CLINIC | Age: 44
End: 2024-11-18

## 2024-11-20 ENCOUNTER — TELEPHONE (OUTPATIENT)
Dept: FAMILY MEDICINE CLINIC | Facility: CLINIC | Age: 44
End: 2024-11-20

## 2024-11-21 DIAGNOSIS — E03.9 ACQUIRED HYPOTHYROIDISM: ICD-10-CM

## 2024-11-21 DIAGNOSIS — Z00.00 WELL WOMAN EXAM (NO GYNECOLOGICAL EXAM): Primary | ICD-10-CM

## 2024-11-21 RX ORDER — LEVOTHYROXINE SODIUM 125 UG/1
125 TABLET ORAL
Qty: 60 TABLET | Refills: 0 | Status: SHIPPED | OUTPATIENT
Start: 2024-11-21

## 2024-11-25 NOTE — TELEPHONE ENCOUNTER
Patient is concerned about her calcium and vitamin D levels, requesting referral to endocrinologist.     Shaheen Feliz Marc, DO  11/21/2024 12:05 PM CST       Pls inform pt that labs demonstrate:  1. Hgb 11.5, slightly lower than previous  2. TSH elevated at 6.91, will need to increase levothyroxine 125mcg po qam, #60 and recheck TSH with free T4 in 6-8wks. Pls place orders. Thyroid ab's are positive for anti-tpo  3. Vit d is low at 26, she should be taking  vit d3 2000iu daily otc  4. FSH is elevated in postmenopausal phase but clinically not in menopause yet, therefore perimenopause  All other labs wnl

## 2024-11-27 ENCOUNTER — HOSPITAL ENCOUNTER (OUTPATIENT)
Dept: MAMMOGRAPHY | Age: 44
Discharge: HOME | End: 2024-11-27
Attending: FAMILY MEDICINE
Payer: COMMERCIAL

## 2024-11-27 DIAGNOSIS — Z12.31 VISIT FOR SCREENING MAMMOGRAM: ICD-10-CM

## 2024-11-27 PROCEDURE — 77063 BREAST TOMOSYNTHESIS BI: CPT | Performed by: FAMILY MEDICINE

## 2024-11-27 PROCEDURE — 77067 SCR MAMMO BI INCL CAD: CPT | Performed by: FAMILY MEDICINE

## 2024-11-27 RX ORDER — LEVOTHYROXINE SODIUM 125 UG/1
125 TABLET ORAL
Qty: 90 TABLET | Refills: 0 | OUTPATIENT
Start: 2024-11-27

## 2024-12-01 ENCOUNTER — HOSPITAL ENCOUNTER (OUTPATIENT)
Dept: ULTRASOUND IMAGING | Age: 44
End: 2024-12-01
Attending: FAMILY MEDICINE
Payer: COMMERCIAL

## 2024-12-01 ENCOUNTER — HOSPITAL ENCOUNTER (OUTPATIENT)
Dept: ULTRASOUND IMAGING | Age: 44
Discharge: HOME | End: 2024-12-01
Attending: FAMILY MEDICINE
Payer: COMMERCIAL

## 2024-12-01 DIAGNOSIS — N92.6 IRREGULAR MENSES: ICD-10-CM

## 2024-12-01 PROCEDURE — 76856 US EXAM PELVIC COMPLETE: CPT | Performed by: FAMILY MEDICINE

## 2024-12-01 PROCEDURE — 76830 TRANSVAGINAL US NON-OB: CPT | Performed by: FAMILY MEDICINE

## 2024-12-05 NOTE — TELEPHONE ENCOUNTER
The calcium is still within range, not necessitating further work-up. The vit d has decreased but will need to increase supplements.

## 2025-01-31 LAB
T4, FREE: 1.8 NG/DL (ref 0.8–1.8)
TSH W/REFLEX TO FT4: 0.16 MIU/L

## 2025-02-09 DIAGNOSIS — E03.9 ACQUIRED HYPOTHYROIDISM: ICD-10-CM

## 2025-02-13 DIAGNOSIS — E03.9 ACQUIRED HYPOTHYROIDISM: Primary | ICD-10-CM

## 2025-02-13 RX ORDER — LEVOTHYROXINE SODIUM 125 UG/1
125 TABLET ORAL
Qty: 90 TABLET | Refills: 0 | OUTPATIENT
Start: 2025-02-13

## 2025-02-13 NOTE — TELEPHONE ENCOUNTER
Please review; protocol failed/No Protocol    Requested Prescriptions   Pending Prescriptions Disp Refills    levothyroxine 125 MCG Oral Tab 60 tablet 0     Sig: Take 1 tablet (125 mcg total) by mouth before breakfast.       Thyroid Medication Protocol Failed - 2/13/2025  8:42 AM        Failed - Last TSH value is normal     Lab Results   Component Value Date    TSH 0.82 08/18/2023    TSHT4 0.16 (L) 01/30/2025                 Passed - TSH in past 12 months        Passed - In person appointment or virtual visit in the past 12 mos or appointment in next 3 mos     Recent Outpatient Visits              3 months ago Well woman exam (no gynecological exam)    [facility], [address] [place] [address] MarcShaheena, DO    Office Visit    9 months ago Nausea and vomiting, unspecified vomiting type    [address] [place], Shaheen Ambrosea, DO    Office Visit    1 year ago Well woman exam with routine gynecological exam    [address] MarcShaheena, DO    Office Visit    2 years ago Well woman exam (no gynecological exam)    [facility], [address] [place] [address] Shaheen Trana, DO    Office Visit    3 years ago Acquired hypothyroidism    [facility], [address] [place] [address] Marc, Shaheen Ambrosea, DO    Office Visit                      Passed - Medication is active on med list             Recent Outpatient Visits              3 months ago Well woman exam (no gynecological exam)    [address] Shaheen Trana, DO    Office Visit    9 months ago Nausea and vomiting, unspecified vomiting type    [address] Shaheen Trana, DO    Office Visit    1 year ago Well woman exam with routine gynecological exam    [address] MarcShaheen,  DO    Office Visit    2 years ago Well woman exam (no gynecological exam)    [facility], [address] [place] [address], Shaheen Feliz, DO    Office Visit    3 years ago Acquired hypothyroidism    [facility], [address] [place] [address], Shaheen Feliz, DO    Office Visit

## 2025-02-14 DIAGNOSIS — E03.9 ACQUIRED HYPOTHYROIDISM: ICD-10-CM

## 2025-02-16 DIAGNOSIS — E03.9 ACQUIRED HYPOTHYROIDISM: ICD-10-CM

## 2025-02-16 RX ORDER — LEVOTHYROXINE SODIUM 125 UG/1
125 TABLET ORAL
Qty: 60 TABLET | Refills: 0 | Status: CANCELLED | OUTPATIENT
Start: 2025-02-16

## 2025-02-19 RX ORDER — LEVOTHYROXINE SODIUM 125 UG/1
125 TABLET ORAL
Qty: 60 TABLET | Refills: 0 | OUTPATIENT
Start: 2025-02-19

## 2025-02-19 RX ORDER — LEVOTHYROXINE SODIUM 125 UG/1
125 TABLET ORAL
Qty: 60 TABLET | Refills: 0 | Status: SHIPPED | OUTPATIENT
Start: 2025-02-19

## 2025-02-19 RX ORDER — LEVOTHYROXINE SODIUM 125 UG/1
125 TABLET ORAL
Qty: 60 TABLET | Refills: 0 | Status: CANCELLED | OUTPATIENT
Start: 2025-02-19

## 2025-02-19 NOTE — TELEPHONE ENCOUNTER
Patient states out of medication     Pended for updated directions per lab note on 02/12/2025:  Pls inform pt that TSH is low, will need to adjust medication. Would recommend she take levothyroxine 125mcg po M-Fri and skip Sat-Sun. Recheck TSH with reflex T4 in 6-8wks. Pls place order

## 2025-02-19 NOTE — TELEPHONE ENCOUNTER
Patient called asking if her refill was put through, she is out of medication and is aware of new dosing schedule    [facility] refill - see pended refill request

## 2025-02-19 NOTE — TELEPHONE ENCOUNTER
Last written: 11/21/2024 for 2 month supply   Patient comment: Need Refill     Medication was refused on 02/13/2025 for responded by other means  Per Last Lab;  Shaheen Tran,   2/12/2025 10:25 PM CST       Pls inform pt that TSH is low, will need to adjust medication. Would recommend she take levothyroxine 125mcg po M-Fri and skip Sat-Sun. Recheck TSH with reflex T4 in 6-8wks. Pls place order         Medication refill pended for your review/approval

## 2025-03-20 ENCOUNTER — TELEPHONE (OUTPATIENT)
Dept: FAMILY MEDICINE CLINIC | Facility: CLINIC | Age: 45
End: 2025-03-20

## 2025-03-20 RX ORDER — LEVOTHYROXINE SODIUM 125 UG/1
125 TABLET ORAL
Qty: 60 TABLET | Refills: 0 | OUTPATIENT
Start: 2025-03-20

## 2025-03-20 NOTE — TELEPHONE ENCOUNTER
Patient called stating that she only received 14 tablets of synthroid and needs 60 tablets. Pharmacy called and they stated that they do not know why it was only for 14 days and she can get the remaining tablets. Patient verbalizes understanding.

## 2025-05-20 LAB — TSH W/REFLEX TO FT4: 1.3 MIU/L

## 2025-05-27 RX ORDER — LEVOTHYROXINE SODIUM 125 UG/1
125 TABLET ORAL
Qty: 60 TABLET | Refills: 5 | Status: SHIPPED | OUTPATIENT
Start: 2025-05-27

## (undated) DIAGNOSIS — E03.9 ACQUIRED HYPOTHYROIDISM: ICD-10-CM

## (undated) NOTE — LETTER
03/12/20        Uma Angela [phone]      Dear Orly Jones records indicate that you have outstanding lab work and or testing that was ordered for you and has not yet been completed:  Orders Placed This Encounter

## (undated) NOTE — MR AVS SNAPSHOT
[facility] [place] [id number] [facility] [id number] [phone]               Thank you for choosing us for your health care visit with NEETU Kaye.   We are glad to serve you and happy to provide you with this summary of yo headache. Don’t give aspirin to anyone younger than 25 who has the flu. Aspirin can harm the liver. · Nausea and loss of appetite are common with the flu. Eat light meals. Drink 6 to 8 glasses of liquids every day.  Good choices are water, sport drinks, so Today's Vital Signs     BP Pulse Temp Weight Breastfeeding?        102/58 mmHg 84 99.3 °F (37.4 °C) (Oral) 123 lb No          Current Medications          This list is accurate as of: 2/28/17 11:23 AM.  Always use your most recent med list.

## (undated) NOTE — LETTER
08/17/20        Uma Brantley [address] [phone]      Dear Vladislav Perez records indicate that you have outstanding lab work and or testing that was ordered for you and has not yet been completed:  Orders Placed This Encounter